# Patient Record
Sex: FEMALE | Race: WHITE | NOT HISPANIC OR LATINO | Employment: FULL TIME | URBAN - METROPOLITAN AREA
[De-identification: names, ages, dates, MRNs, and addresses within clinical notes are randomized per-mention and may not be internally consistent; named-entity substitution may affect disease eponyms.]

---

## 2017-08-09 ENCOUNTER — TELEPHONE (OUTPATIENT)
Dept: UROLOGY | Facility: CLINIC | Age: 42
End: 2017-08-09

## 2017-08-09 DIAGNOSIS — N20.0 NEPHROLITHIASIS: Primary | ICD-10-CM

## 2017-08-09 RX ORDER — HYDROCODONE BITARTRATE AND ACETAMINOPHEN 5; 325 MG/1; MG/1
1 TABLET ORAL EVERY 6 HOURS PRN
Qty: 20 TABLET | Refills: 0 | Status: SHIPPED | OUTPATIENT
Start: 2017-08-09 | End: 2017-08-17 | Stop reason: SDUPTHER

## 2017-08-09 RX ORDER — TAMSULOSIN HYDROCHLORIDE 0.4 MG/1
0.4 CAPSULE ORAL
Qty: 30 CAPSULE | Refills: 0 | Status: ON HOLD | OUTPATIENT
Start: 2017-08-09 | End: 2017-08-21 | Stop reason: HOSPADM

## 2017-08-09 RX ORDER — CIPROFLOXACIN 500 MG/1
500 TABLET ORAL 2 TIMES DAILY
Qty: 28 TABLET | Refills: 0 | Status: ON HOLD | OUTPATIENT
Start: 2017-08-09 | End: 2017-08-21 | Stop reason: HOSPADM

## 2017-08-09 NOTE — TELEPHONE ENCOUNTER
This pt was seen at [facility] today but note was written into [facility] for convenience and making future follow-up easier. It was copied over to [facility] notes.     [facility] consult Note - [facility]  Staff: MD Naldo    Referring provider and please cc: Jesús Castillo  PCP:     [facility]: [place]    Chief Complaint: left flank pain    Subjective:     HPI: Nilsa York is a 42 y.o. female presents with     Pt presented to [place] 2 weeks ago with left sided flank pain. Was found to have kidney stones and was sent home with flomax and pain medicines. She was told she had no infection. Pain improved, however this morning started having severe left flank pain 10/10 with dysuria, frequency and urgency.  She state she had no fevers or chills at home. She's had 1 uti years ago.     This is her first stone.     ECOG Status: 0    REVIEW OF SYSTEMS:  General ROS: no fevers, no chills  Psychological ROS: no depression  Endocrine ROS: no heat or cold  Respiratory ROS: no SOB  Cardiovascular ROS: no CP  Gastrointestinal ROS: no abdominal pain, no constipation, no diarrhea, noBRBPR  Musculoskeletal ROS: no muscle pain  Neurological ROS: no headaches  Dermatological ROS: no rashes  HEENT: noglasses, no sinus   ROS: per HPI     PMHx:  No past medical history on file.  Kidney stones: Yes - as above.    PSHx:  No past surgical history on file.   Cholecystectomy  Gastric sleeve  BTL  Urologic or Gynecologic Surgery: btl    Stents/Valves/Foreign Bodies: No  Cardiac Evaluation: No    Screening Studies  Colonoscopy: no    Fam Hx:   malignancies: No  kidney stones: No     Soc Hx:  No tobacco.   No alcohol  Lives in [place]  :single  Children: 6  Occupation:CNA    Allergies:  Review of patient's allergies indicates not on file.   nkda    Medications: reviewed   Anticoagulation: No    Objective:   Vitals reviewed    General:WDWN in NAD  Eyes: PERRLA, normal conjunctiva  Respiratory: no increased work on  breathing, clear to auscultation  Cardiovascular: regular rate and rhythm. No obvious extremity edema.  GI: no palpation of masses. No tenderness. No hepatosplenomegaly to palpation.  Musculoskeletal: normal range of motion of bilateral upper extremities. Normal muscle strength and tone.  Skin: no obvious rashes or lesions. No tightening of skin noted.  Neurologic: CN grossly normal. Normal sensation.   Psychiatric: awake, alert and oriented x 3. Mood and affect normal. Cooperative.    Pelvic exam  deferred    LABS REVIEW:  UA today: nitrite +/leuk+/blood +  UCx:   pending  Cr: No results found for: CREATININE    PATHOLOGY REVIEW:  none    RADIOGRAPHIC REVIEW:  [facility] 8/9/17 [facility]  1. 6.5 mm x 4 mm calculus obstructing the distal left ureter with left-sided hydronephrosis   2. 2mm lup stones x 3, one in possible diverticulum. rmp 5mm stone.   3. Mild hepatosplenomegaly and hepatic fatty infiltration 4. No other convincing acute process on this limited noncontrasted study         Assessment:     1. Nephrolithiasis        Plan:     To OR today for cysto stent and possible ureteroscopy. I explained with her nitrite + urine that we may just place stent. I said that if there was pus seen behind the stone I would just place a stent. However if there was not, then I may proceed with ureteroscopy as she's had no fevers and her wbc is only 6.2.    I also offered her stent placement today with ureteroscopy in 2 weeks to address her left renal stones as well which may be in a diverticulum. At this point she wants to just proceed with treatment of the distal stone.    She will need a stone workup and ultimately will need treatment of her renal stones.    Also told her that with her nitrite + urine she would need a ibarra and to remain here for at least 24 hours. If she has no fever overnight she can have ibarra removed and discharged tomorrow at 6 pm with po abx, pain medicine and flomax which I will send in.          Nilsa ESTEVEZ  MD Naldo

## 2017-08-09 NOTE — TELEPHONE ENCOUNTER
I did ureteroscopy today on this pt at [facility]    Please make sure pt has a 9am f/u next Thursday to discuss her stent. I sent a message about moving another pt.     [facility]

## 2017-08-09 NOTE — TELEPHONE ENCOUNTER
This pt was seen at [facility] today but note was written into [facility] for convenience and making future follow-up easier. It was copied over to [facility] notes.     [facility] - Procedure done at [facility]  Operative Note    Date: 08/09/2017    Pre-Op Diagnosis: left uvj stone    Post-Op Diagnosis: same    Procedure(s) Performed:   1.  Left ureteroscopy, basket stone extraction  2.  Left rgp  3.  Left stent placement  4.  Fluoro < 1 h    Specimen(s): stone from left uvj. urinalyis and culture from left kidney.     Staff Surgeon: Nilsa Hitchcock MD    Anesthesia: General endotracheal anesthesia    Indications: Nilsa York is a 42 y.o. female with a left distal ureteral stone, flank pain, nitrite + urine, presenting for definitive stone management.  She currently does not have a JJ ureteral stent in place.  She was given rocephin this morning at 5 and another 2g prior to procedure.     Findings:   1. Left uvj stone with proximal hydro,  2. The stone was difficult to see if it was radio-opaque bc it was overlying the pelvic bone  3. No pus seen proximal stone, completely clear so I decided to proceed with ureteroscopy and stone extraction.    1 basket(s) were used throughout the case.      Estimated Blood Loss: min    Drains: 6x22 JJ stent without strings, 16 fr ibarra    Procedure in detail:  After informed consent was obtained, the patient was brought the the cystoscopy suite and placed in the supine position.  SCDs were applied and working.  GETA was administered.  The patient was then placed in the dorsal lithotomy position and prepped and draped in the usual sterile fashion.      A rigid cystoscope in a 22 Fr sheath was introduced into the patient's urethra.  This passed easily.  The entire urethra was visualized which showed no strictures or masses.  Formal cystoscopy was performed which revealed no masses or lesions suspicious for malignancy, no bladder stones, no bladder diverticuli, no  trabeculations.  The ureteral orifices were visualized in the normal anatomic position bilaterally and clear efflux was visualized.      A 0.38 sensor tip wire was passed up the left ureteral orifice and up into the kidney, the stone was felt with the wire.  This passed easily and placement was confirmed using fluoro. A 5 [place] open ended ureteral catheter was palced over the wire and the wire was removed. Urine was obtained from the left ureter and kidney and was clear yellow. I went ahead and sent a culture. The wire was replaced through the 5 [place] open ended ureteral cathetera and the 5 [place] open ended ureteral catheter was removed.   The cystoscope was removed keeping the guidewire in place and the wire was secured to the drape.      An 8 Fr rigid ureteroscope was passed into the patient's bladder alongside the wire under direct vision easily without much dilation required.  It was then passed through the left UO alongside the wire.  A stone was encountered at the level of distal ureter.  A Nitinol tipless basket was introduced through the ureteroscope.  The entire stone was basketed and removed.  The ureteroscope was removed keeping the wire in place. The ureterosope was advanced back into the ureter and the left ureter was drained. A rgp was then performed through the ureteroscope.  The wire was backloaded through the cytoscope using a pusher.    A 6x22 JJ ureteral stent without strings was passed over the wire and up into the renal pelvis using fluoro.  When the coil appeared to be in good position int he kidney and the radio-opaque marker of the pusher was at the inferior pubis, the wire was removed under continuous fluoro.  Good coils were seen in the kidney and the bladder using fluoro.      A ibarra catheter was placed.    The patient tolerated the procedure well and was transferred to the recovery room in stable condition.      Disposition:    The pt will remain in the hospital overnight on iv abx  and fluids.  Bolus 1 L and start cipro 500 bid   If pt has no temp>100 then ibarra can be removed at 5am.  If she has no temp >100 and she is able to void then she can be discharged at 5pm tomorrow.     I plan on sending her home on cipro 500 bid x 10 days, norco 5 (disp 20) and will have her continue flomax. I have sent the rx escript to [facility].    She will return next Thursdayf for a urinalysis and urine culture. Will discuss with her either stent removal the following Monday vs ueteroscopy to remove the 3 remaining stones in her left kidney which I suggest.       Nilsa Hitchcock MD

## 2017-08-10 ENCOUNTER — TELEPHONE (OUTPATIENT)
Dept: UROLOGY | Facility: CLINIC | Age: 42
End: 2017-08-10

## 2017-08-10 NOTE — TELEPHONE ENCOUNTER
----- Message from Nilsa Hitchcock MD sent at 8/9/2017  5:20 PM CDT -----  I vinicius did a ureteroscopy on her today at [facility]  Pt is going to f/u next Thursday at 9am  You can move rian mosquera to annual with abigail

## 2017-08-10 NOTE — TELEPHONE ENCOUNTER
Patient scheduled.  Unable to contact patient to inform of appointment. Will inform the nurse at [facility] (spoke to Mrs. Francois)

## 2017-08-11 ENCOUNTER — TELEPHONE (OUTPATIENT)
Dept: UROLOGY | Facility: CLINIC | Age: 42
End: 2017-08-11

## 2017-08-11 NOTE — TELEPHONE ENCOUNTER
Called and spoke with the pharmacist at [facility], informed her that the patient has not been seen at our office as of yet and we have no insurance info on patient to get a prior auth even started. They will notify the patient, she verbally understood.

## 2017-08-11 NOTE — TELEPHONE ENCOUNTER
Call placed pharmacy, inquired about patient needing a prior auth started on flomax. She stated that it was just faxed to our office, awaiting paperwork to get it started.

## 2017-08-11 NOTE — TELEPHONE ENCOUNTER
----- Message from Mago Jewell sent at 8/11/2017 12:22 PM CDT -----  Please call patient in regards to needing a authorization on [employer], [phone]    [facility] [id number] - [place], [place] - [address] AT [facility] [address]  [address]  [place] [address]  Phone: [phone] Fax: [phone]

## 2017-08-14 ENCOUNTER — TELEPHONE (OUTPATIENT)
Dept: UROLOGY | Facility: CLINIC | Age: 42
End: 2017-08-14

## 2017-08-14 NOTE — TELEPHONE ENCOUNTER
----- Message from Nilsa Hitchcock MD sent at 8/9/2017  5:06 PM CDT -----  F/u urine culture from [facility]

## 2017-08-14 NOTE — TELEPHONE ENCOUNTER
----- Message from Casie Rosado sent at 8/14/2017  8:11 AM CDT -----  Patient needs return to work letter faxed to [phone] ([place] )atten: Mariola Devries/please call patient back at [phone] to confirm.

## 2017-08-17 ENCOUNTER — OFFICE VISIT (OUTPATIENT)
Dept: UROLOGY | Facility: CLINIC | Age: 42
End: 2017-08-17
Payer: MEDICAID

## 2017-08-17 VITALS
HEIGHT: 64 IN | BODY MASS INDEX: 37.48 KG/M2 | HEART RATE: 87 BPM | SYSTOLIC BLOOD PRESSURE: 140 MMHG | WEIGHT: 219.56 LBS | DIASTOLIC BLOOD PRESSURE: 54 MMHG | TEMPERATURE: 99 F

## 2017-08-17 DIAGNOSIS — N20.0 NEPHROLITHIASIS: Primary | ICD-10-CM

## 2017-08-17 PROCEDURE — 87086 URINE CULTURE/COLONY COUNT: CPT

## 2017-08-17 PROCEDURE — 99214 OFFICE O/P EST MOD 30 MIN: CPT | Mod: PBBFAC,PO | Performed by: UROLOGY

## 2017-08-17 PROCEDURE — 99213 OFFICE O/P EST LOW 20 MIN: CPT | Mod: S$PBB,,, | Performed by: UROLOGY

## 2017-08-17 PROCEDURE — 3008F BODY MASS INDEX DOCD: CPT | Mod: ,,, | Performed by: UROLOGY

## 2017-08-17 PROCEDURE — 87077 CULTURE AEROBIC IDENTIFY: CPT

## 2017-08-17 PROCEDURE — 87186 SC STD MICRODIL/AGAR DIL: CPT

## 2017-08-17 PROCEDURE — 87088 URINE BACTERIA CULTURE: CPT

## 2017-08-17 PROCEDURE — 99999 PR PBB SHADOW E&M-EST. PATIENT-LVL IV: CPT | Mod: PBBFAC,,, | Performed by: UROLOGY

## 2017-08-17 RX ORDER — HYDROCODONE BITARTRATE AND ACETAMINOPHEN 5; 325 MG/1; MG/1
1 TABLET ORAL EVERY 6 HOURS PRN
Qty: 30 TABLET | Refills: 0 | Status: ON HOLD | OUTPATIENT
Start: 2017-08-17 | End: 2017-08-21 | Stop reason: HOSPADM

## 2017-08-17 NOTE — PROGRESS NOTES
[facility] Progress Note - [place]  Urology Group: Naldo/Zachary/Willis/[facility]  PCP: Dr.Jeffrey Buntin [facility]: active    Chief Complaint: nephrolithiasis      Subjective:        HPI: Nilsa York is a 42 y.o. female presents with     Pt presented to [place] 3 weeks ago with left sided flank pain. Was found to have kidney stones and was sent home with flomax and pain medicines. She was told she had no infection. Pain improved, however on 8/9/17 started having severe left flank pain 10/10 with dysuria, frequency and urgency.  She state she had no fevers or chills at home. She's had 1 uti years ago. Went to . Found to have nitrite + urine. cx grew no bacteria. She had a left 7x4mm uvj stone and bc it was so distal and the urine was clear proximal to this I went ahead and removed it and placed a stent without strings. She was discharged the following day on cipro 500 bid x 10d.    She did fine after, tolerated stent with pain medicine. Returned 5d later on Tuesday to [facility] with severe pain. ua was negative. Ct at [facility] ERdone showed no more stones. Urine today. She's taking norco 5 every 4 hours. Still on cipro, has no more pain medicine.     +dysuria     This is her first stone      REVIEW OF SYSTEMS:  General ROS: no fevers, no chills  Psychological ROS: no depression  Endocrine ROS: no heat or cold  Respiratory ROS: no SOB  Cardiovascular ROS: no CP  Gastrointestinal ROS: no abdominal pain, no constipation, no diarrhea, no BRBPR  Musculoskeletal ROS: no muscle pain  Neurological ROS: no headaches  Dermatological ROS: no rashes  HEENT: noglasses, no sinus   ROS: per HPI    The past medical, surgical, social and family hx were reviewed. There have not been any changes.       Urologic meds: flomax  Anticoagulation: No    Objective:     Vitals:    08/17/17 1009   BP: (!) 140/54   Pulse: 87   Temp: 98.5 °F (36.9 °C)          Physical Exam   Nursing note and vitals  reviewed.  Constitutional: She is oriented to person, place, and time. She appears well-developed and well-nourished.   HENT:   Head: Normocephalic and atraumatic.   Eyes: Pupils are equal, round, and reactive to light.   Cardiovascular: Normal rate and regular rhythm.    Pulmonary/Chest: Effort normal.   Abdominal: Soft.   Musculoskeletal: Normal range of motion.   Neurological: She is alert and oriented to person, place, and time.   Skin: Skin is intact.     Psychiatric: She has a normal mood and affect.         Urinalysis: bloody  UA   UA 8/9/17: nitrite +/leuk+/blood +  UCx:   8/9/17 Ng  8/15/17 ng    Cr: No results found for: CREATININE     PATHOLOGY REVIEW:  none     RADIOGRAPHIC REVIEW:  [facility] 8/15/17 [facility]  1. Interval placement of a left-sided double-J ureteral stent with no hydronephrosis or hydroureter seen in either kidney. There is mild left-sided periureteric fat stranding, a somewhat nonspecific finding likely related to the recent instrumentation, or possibly mild infection (consider correlation with urinalysis).   2. Interval removal/nonvisualization of the previously described distal right ureter stone.   3. Small bilateral nonobstructing renal stones.   4. Borderline hepatosplenomegaly.   5. Additional incidental findings as noted above similar to unchanged from the previous exam.       [facility] 8/9/17 h  1. 6.5 mm x 4 mm calculus obstructing the distal left ureter with left-sided hydronephrosis   2. 2mm lup stones x 3, one in possible diverticulum. rmp 5mm stone.   3. Mild hepatosplenomegaly and hepatic fatty infiltration 4. No other convincing acute process on this limited noncontrasted study     Assessment:       1. Nephrolithiasis        Plan:     Refill pain medicine today  Will write for uribel   Send urine for culture today    Discussed proceeding w urs to remove remaining renal stones. However stones in kidney are very small. May be in a diverticulum. Will address at another time. Pt not  tolerating stent and wants to go back to work.     May need to irrigate bladder first prior to cysto stent removal  Urine has been bloody  Scheduled for cysto stent removal as first case 8/21/17  Gent 80 prior

## 2017-08-21 ENCOUNTER — APPOINTMENT (OUTPATIENT)
Dept: LAB | Facility: HOSPITAL | Age: 42
End: 2017-08-21
Attending: UROLOGY
Payer: MEDICAID

## 2017-08-21 ENCOUNTER — HOSPITAL ENCOUNTER (OUTPATIENT)
Facility: AMBULARY SURGERY CENTER | Age: 42
Discharge: HOME | End: 2017-08-21
Attending: UROLOGY | Admitting: UROLOGY
Payer: MEDICAID

## 2017-08-21 ENCOUNTER — SURGERY (OUTPATIENT)
Age: 42
End: 2017-08-21

## 2017-08-21 ENCOUNTER — TELEPHONE (OUTPATIENT)
Dept: UROLOGY | Facility: CLINIC | Age: 42
End: 2017-08-21

## 2017-08-21 DIAGNOSIS — N20.0 NEPHROLITHIASIS: ICD-10-CM

## 2017-08-21 LAB — BACTERIA UR CULT: NORMAL

## 2017-08-21 PROCEDURE — 52310 CYSTOSCOPY AND TREATMENT: CPT | Mod: ,,, | Performed by: UROLOGY

## 2017-08-21 PROCEDURE — 87086 URINE CULTURE/COLONY COUNT: CPT

## 2017-08-21 PROCEDURE — 52310 CYSTOSCOPY AND TREATMENT: CPT | Performed by: UROLOGY

## 2017-08-21 RX ORDER — GENTAMICIN SULFATE 80 MG/100ML
80 INJECTION, SOLUTION INTRAVENOUS
Status: DISCONTINUED | OUTPATIENT
Start: 2017-08-21 | End: 2017-08-21

## 2017-08-21 RX ORDER — GENTAMICIN SULFATE 40 MG/ML
INJECTION, SOLUTION INTRAMUSCULAR; INTRAVENOUS
Status: COMPLETED
Start: 2017-08-21 | End: 2017-08-21

## 2017-08-21 RX ORDER — WATER 1 ML/ML
IRRIGANT IRRIGATION
Status: DISCONTINUED | OUTPATIENT
Start: 2017-08-21 | End: 2017-08-21 | Stop reason: HOSPADM

## 2017-08-21 RX ORDER — GENTAMICIN SULFATE 40 MG/ML
80 INJECTION, SOLUTION INTRAMUSCULAR; INTRAVENOUS ONCE
Status: COMPLETED | OUTPATIENT
Start: 2017-08-21 | End: 2017-08-21

## 2017-08-21 RX ORDER — NITROFURANTOIN (MACROCRYSTALS) 100 MG/1
100 CAPSULE ORAL EVERY 12 HOURS
Qty: 14 CAPSULE | Refills: 2 | Status: SHIPPED | OUTPATIENT
Start: 2017-08-21 | End: 2017-08-25

## 2017-08-21 RX ORDER — LIDOCAINE HYDROCHLORIDE 20 MG/ML
JELLY TOPICAL
Status: DISCONTINUED | OUTPATIENT
Start: 2017-08-21 | End: 2017-08-21 | Stop reason: HOSPADM

## 2017-08-21 RX ADMIN — WATER 500 ML: 1 IRRIGANT IRRIGATION at 02:08

## 2017-08-21 RX ADMIN — LIDOCAINE HYDROCHLORIDE 5 ML: 20 JELLY TOPICAL at 02:08

## 2017-08-21 RX ADMIN — GENTAMICIN SULFATE 80 MG: 40 INJECTION, SOLUTION INTRAMUSCULAR; INTRAVENOUS at 02:08

## 2017-08-21 NOTE — DISCHARGE SUMMARY
[facility]  Discharge Note  Short Stay    Admit Date: 8/21/2017    Discharge Date and Time: No discharge date for patient encounter.     Attending Physician: Nilsa Hitchcock,*     Discharge Provider: Nilsa Hitchcock    Diagnoses:  Active Hospital Problems    Diagnosis  POA    Nephrolithiasis [N20.0]  Yes      Resolved Hospital Problems    Diagnosis Date Resolved POA   No resolved problems to display.       Discharged Condition: good    Hospital Course: Patient was admitted for an outpatient procedure and tolerated the procedure well with no complications.    Final Diagnoses: Same as principal problem.    Disposition: Home or Self Care    Follow up/Patient Instructions:    Medications:  Reconciled Home Medications:   Current Discharge Medication List      START taking these medications    Details   nitrofurantoin (MACRODANTIN) 100 MG capsule Take 1 capsule (100 mg total) by mouth every 12 (twelve) hours.  Qty: 14 capsule, Refills: 2         STOP taking these medications       ciprofloxacin HCl (CIPRO) 500 MG tablet Comments:   Reason for Stopping:         hydrocodone-acetaminophen 5-325mg (NORCO) 5-325 mg per tablet Comments:   Reason for Stopping:         methen-m.blue-s.phos-phsal-hyo (URIBEL) 118-10-40.8-36 mg Cap Comments:   Reason for Stopping:         tamsulosin (FLOMAX) 0.4 mg Cp24 Comments:   Reason for Stopping:               Discharge Procedure Orders  X-Ray Abdomen AP 1 View   Standing Status: Future  Standing Exp. Date: 08/21/18   Order Specific Question Answer Comments   Reason for Exam: left renal stones      Diet general           Discharge Procedure Orders (must include Diet, Follow-up, Activity):    Discharge Procedure Orders (must include Diet, Follow-up, Activity)  X-Ray Abdomen AP 1 View   Standing Status: Future  Standing Exp. Date: 08/21/18   Order Specific Question Answer Comments   Reason for Exam: left renal stones      Diet general

## 2017-08-21 NOTE — OP NOTE
[facility] Procedure Note  Date: 08/21/2017    Procedure: Flexible cysto-uretheroscopy and left stent removal    Pre Procedure Diagnosis: nephrolithiasis    Post Procedure Diagnosis: same    UA: tr blood, no sx of uti - given gent preop  ucx + 8/17/17 for e.coli ESBL - has been on cipro (not sensitive)    Surgeon: Nilsa Hitchcock MD    Specimen: urine for culture    Anesthesia: 2% uro-jet lidocaine jelly for local analgesia    Flexible cysto-urethroscopy was performed after consent was obtained.  The risks and benefits were explained.    2% lidocaine urojet was used for local analgesia.  The genitalia was prepped and draped in the sterile fashion with betadine.    The flexible scope was advanced into the urethra and into the bladder.  Bilateral ureteral orifice were evaluated and noted to be normal with clear efflux.  The bladder was completely surveyed in a systematic fashion.   No bladder tumors or lesions were seen.  No strictures were noted.    A stent was identified, grasped and removed.     The patient tolerated the procedure well without complication.    HPI: Nilsa York is a 42 y.o. female who presents for cystoscopy today for stent removal    Findings: (pictures were not uploaded into media)  1. Stent without encrustations      Plan:  1. F/u 6 months with kub prior to eval L renal stones  2. Counseled to go to ER if she has fever  3. Discontinue cipro and start macrobid 100 po bid x 7 days (sensitive)      Follow up:  6 months with kub prior  1st stone, may do stone workup depending on what stones look like

## 2017-08-21 NOTE — H&P
[facility] H&P Note - [place]  Urology Group: Naldo/Zachary/Willis/[facility]  PCP: Dr.Jeffrey Buntin [facility]: active     Chief Complaint: nephrolithiasis        Subjective:        HPI: Nilsa York is a 42 y.o. female presents with      Pt presented to [place] 3 weeks ago with left sided flank pain. Was found to have kidney stones and was sent home with flomax and pain medicines. She was told she had no infection. Pain improved, however on 8/9/17 started having severe left flank pain 10/10 with dysuria, frequency and urgency.  She state she had no fevers or chills at home. She's had 1 uti years ago. Went to . Found to have nitrite + urine. cx grew no bacteria. She had a left 7x4mm uvj stone and bc it was so distal and the urine was clear proximal to this I went ahead and removed it and placed a stent without strings. She was discharged the following day on cipro 500 bid x 10d.     She did fine after, tolerated stent with pain medicine. Returned 5d later on Tuesday to [facility] with severe pain. ua was negative. Ct at [facility] ERdone showed no more stones. Urine today. She's taking norco 5 every 4 hours. Still on cipro, has no more pain medicine.      +dysuria     This is her first stone        REVIEW OF SYSTEMS:  General ROS: no fevers, no chills  Psychological ROS: no depression  Endocrine ROS: no heat or cold  Respiratory ROS: no SOB  Cardiovascular ROS: no CP  Gastrointestinal ROS: no abdominal pain, no constipation, no diarrhea, no BRBPR  Musculoskeletal ROS: no muscle pain  Neurological ROS: no headaches  Dermatological ROS: no rashes  HEENT: noglasses, no sinus   ROS: per HPI     The past medical, surgical, social and family hx were reviewed. There have not been any changes.         Urologic meds: flomax  Anticoagulation: No     Objective:      Vitals:    08/21/17 1353   BP: 126/73   Pulse: 65   Resp: 18   Temp: 98.3 °F (36.8 °C)             Physical Exam   Nursing note and vitals  reviewed.  Constitutional: She is oriented to person, place, and time. She appears well-developed and well-nourished.   HENT:   Head: Normocephalic and atraumatic.   Eyes: Pupils are equal, round, and reactive to light.   Cardiovascular: Normal rate and regular rhythm.    Pulmonary/Chest: Effort normal.   Abdominal: Soft.   Musculoskeletal: Normal range of motion.   Neurological: She is alert and oriented to person, place, and time.   Skin: Skin is intact.     Psychiatric: She has a normal mood and affect.          Urinalysis: 1.025/5/100 protein/250 blood - send for cultrue    UCx:   8/17/17 E.coli, ESBL   8/9/17            Ng,  nitrite +/leuk+/blood +  8/15/17                      ng     Cr: No results found for: CREATININE     PATHOLOGY REVIEW:  none     RADIOGRAPHIC REVIEW:  [facility] 8/15/17 [facility]  1. Interval placement of a left-sided double-J ureteral stent with no hydronephrosis or hydroureter seen in either kidney. There is mild left-sided periureteric fat stranding, a somewhat nonspecific finding likely related to the recent instrumentation, or possibly mild infection (consider correlation with urinalysis).   2. Interval removal/nonvisualization of the previously described distal right ureter stone.   3. Small bilateral nonobstructing renal stones.   4. Borderline hepatosplenomegaly.   5. Additional incidental findings as noted above similar to unchanged from the previous exam.         [facility] 8/9/17 [facility]  1. 6.5 mm x 4 mm calculus obstructing the distal left ureter with left-sided hydronephrosis   2. 2mm lup stones x 3, one in possible diverticulum. rmp 5mm stone.   3. Mild hepatosplenomegaly and hepatic fatty infiltration 4. No other convincing acute process on this limited noncontrasted study      Assessment:       1. Nephrolithiasis        Plan:      Refill pain medicine today  Will write for uribel     Discussed proceeding w urs to remove remaining renal stones. However stones in kidney are very small. May be  in a diverticulum. Will address at another time. Pt not tolerating stent and wants to go back to work.      May need to irrigate bladder first prior to cysto stent removal  Urine has been bloody. Culture 8/17/17 showed e.coli esbl   Scheduled for cysto stent removal as first case today  Gent 80 prior    This patient has been cleared for surgery in ambulatory surgical facility.

## 2017-08-21 NOTE — PLAN OF CARE
Pt states ready to go home , stable, stef po fluids, ambulatory, denies pain. To BR voided wo difficulty. Denies burning/bleeding. Ambulated to car w/ spouse. Home w/ spouse

## 2017-08-22 VITALS
RESPIRATION RATE: 20 BRPM | HEIGHT: 64 IN | TEMPERATURE: 98 F | HEART RATE: 64 BPM | OXYGEN SATURATION: 100 % | WEIGHT: 219 LBS | SYSTOLIC BLOOD PRESSURE: 153 MMHG | DIASTOLIC BLOOD PRESSURE: 74 MMHG | BODY MASS INDEX: 37.39 KG/M2

## 2017-08-22 LAB — BACTERIA UR CULT: NORMAL

## 2017-08-23 ENCOUNTER — TELEPHONE (OUTPATIENT)
Dept: UROLOGY | Facility: CLINIC | Age: 42
End: 2017-08-23

## 2017-08-23 NOTE — TELEPHONE ENCOUNTER
----- Message from Marilyn Cai sent at 8/23/2017  8:19 AM CDT -----  Contact: Patient  Nilsa, patient [phone], returning the nurse's call. Please advise. Thanks.

## 2017-08-23 NOTE — TELEPHONE ENCOUNTER
Phoned patient she states she is returning a call from Sushant. Informed patient will forward message and will have sushant give her a call back. Patient verbally voiced understanding.

## 2018-02-21 ENCOUNTER — TELEPHONE (OUTPATIENT)
Dept: UROLOGY | Facility: CLINIC | Age: 43
End: 2018-02-21

## 2019-06-11 ENCOUNTER — OFFICE VISIT (OUTPATIENT)
Dept: URGENT CARE | Facility: CLINIC | Age: 44
End: 2019-06-11
Payer: OTHER MISCELLANEOUS

## 2019-06-11 VITALS
OXYGEN SATURATION: 99 % | DIASTOLIC BLOOD PRESSURE: 86 MMHG | HEIGHT: 64 IN | WEIGHT: 181.63 LBS | SYSTOLIC BLOOD PRESSURE: 140 MMHG | HEART RATE: 77 BPM | TEMPERATURE: 98 F | BODY MASS INDEX: 31.01 KG/M2

## 2019-06-11 DIAGNOSIS — M25.561 ACUTE PAIN OF RIGHT KNEE: Primary | ICD-10-CM

## 2019-06-11 PROCEDURE — 99204 PR OFFICE/OUTPT VISIT, NEW, LEVL IV, 45-59 MIN: ICD-10-PCS | Mod: 25,S$GLB,, | Performed by: NURSE PRACTITIONER

## 2019-06-11 PROCEDURE — 73564 X-RAY EXAM KNEE 4 OR MORE: CPT | Mod: RT,S$GLB,, | Performed by: NURSE PRACTITIONER

## 2019-06-11 PROCEDURE — 73564 PR  X-RAY KNEE 4+ VIEW: ICD-10-PCS | Mod: RT,S$GLB,, | Performed by: NURSE PRACTITIONER

## 2019-06-11 PROCEDURE — 99204 OFFICE O/P NEW MOD 45 MIN: CPT | Mod: 25,S$GLB,, | Performed by: NURSE PRACTITIONER

## 2019-06-11 RX ORDER — TOPIRAMATE 100 MG/1
200 TABLET, FILM COATED ORAL 2 TIMES DAILY
COMMUNITY

## 2019-06-11 RX ORDER — IBUPROFEN 800 MG/1
800 TABLET ORAL 3 TIMES DAILY
COMMUNITY
End: 2019-07-05 | Stop reason: SDUPTHER

## 2019-06-11 RX ORDER — LISINOPRIL 40 MG/1
40 TABLET ORAL DAILY
COMMUNITY

## 2019-06-11 RX ORDER — DEXTROMETHORPHAN HYDROBROMIDE, GUAIFENESIN 5; 100 MG/5ML; MG/5ML
650 LIQUID ORAL EVERY 8 HOURS
COMMUNITY

## 2019-06-11 RX ORDER — IBUPROFEN 800 MG/1
800 TABLET ORAL EVERY 8 HOURS PRN
Qty: 30 TABLET | Refills: 0 | Status: SHIPPED | OUTPATIENT
Start: 2019-06-11 | End: 2019-06-21

## 2019-06-11 RX ORDER — VENLAFAXINE HYDROCHLORIDE 150 MG/1
225 CAPSULE, EXTENDED RELEASE ORAL DAILY
COMMUNITY

## 2019-06-11 NOTE — PATIENT INSTRUCTIONS
Knee Pain  Knee pain is very common. Its especially common in active people who put a lot of pressure on their knees, like runners. It affects women more often than men.  Your kneecap (patella) is a thick, round bone. It covers and protects the front portion of your knee joint. It moves along a groove in your thighbone (femur) as part of the patellofemoral joint. A layer of cartilage surrounds the underside of your kneecap. This layer protects it from grinding against your femur.  When this cartilage softens and breaks down, it can cause knee pain. This is partly because of repetitive stress. The stress irritates the lining of the joint. This causes pain in the underlying bone.  What causes knee pain?  Many things can cause knee pain. You may have more than one cause. Some of these include:  · Overuse of the knee joint  · The kneecap doesnt line up with the tissue around it  · Damage to small nerves in the area  · Damage to the ligament-like structure that holds the kneecap in place (retinaculum)  · Breakdown of the bone under the cartilage  · Swelling in the soft tissues around the kneecap  · Injury  You might be more likely to have knee pain if you:  · Exercise a lot  · Recently increased the intensity of your workouts  · Have a body mass index (BMI) greater than 25  · Have poor alignment of your kneecap  · Walk with your feet turned overly outward or inward  · Have weakness in surrounding muscle groups (inner quad or hip adductor muscles)  · Have too much tightness in surrounding muscle groups (hamstrings or iliotibial band)  · Have a recent history of injury to the area  · Are female  Symptoms of knee pain  This type of knee pain is a dull, aching pain in the front of the knee in the area under and around the kneecap. This pain may start quickly or slowly. Your pain might be worse when you squat, run, or sit for a long time. You might also sometimes feel like your knee is giving out. You may have symptoms in  one or both of your knees.  Diagnosing knee pain  Your healthcare provider will ask about your medical history and your symptoms. Be sure to describe any activities that make your knee pain worse. He or she will look at your knee. This will include tests of your range of motion, strength, and areas of pain of your knee. Your knee alignment will be checked.  Your healthcare provider will need to rule out other causes of your knee pain, such as arthritis. You may need an imaging test, such as an X-ray or MRI.  Treatment for knee pain  Treatments that can help ease your symptoms may include:  · Avoiding activities for a while that make your pain worse, returning to activity over time  · Icing the outside of your knee when it causes you pain  · Taking over-the-counter pain medicine  · Wearing a knee brace or taping your knee to support it  · Wearing special shoe inserts to help keep your feet in the proper alignment  · Doing special exercises to stretch and strengthen the muscles around your hip and your knee  These steps help most people manage knee pain. But some cases of knee pain need to be treated with surgery. You may need surgery right away. Or you may need it later if other treatments dont work. Your healthcare provider may refer you to an orthopedic surgeon. He or she will talk with you about your choices.  Preventing knee pain  Losing weight and correcting excess muscle tightness or muscle weakness may help lower your risk.  In some cases, you can prevent knee pain. To help prevent a flare-up of knee pain, you do these things:  · Regularly do all the exercises your doctor or physical therapist advises  · Support your knee as advised by your doctor or physical therapist  · Increase training gradually, and ease up on training when needed  · Have an expert check your gait for running or other sporting activities  · Stretch properly before and after exercise  · Replace your running shoes regularly  · Lose excess  weight     When to call your healthcare provider  Call your healthcare provider right away if:  · Your symptoms dont get better after a few weeks of treatment  · You have any new symptoms   Date Last Reviewed: 4/1/2017 © 2000-2017 [employer]. [address] [place] [address], [place] [address]. All rights reserved. This information is not intended as a substitute for professional medical care. Always follow your healthcare professional's instructions.        R.I.C.E.    R.I.C.E. stands for Rest, Ice, Compression, and Elevation. Doing these things helps limit pain and swelling after an injury. R.I.C.E. also helps injuries heal faster. Use R.I.C.E. for sprains, strains, and severe bruises or bumps. Follow the tips on this handout and begin R.I.C.E. as soon as possible after an injury.  ? Rest  Pain is your bodys way of telling you to rest an injured area. Whether you have hurt an elbow, hand, foot, or knee, limiting its use will prevent further injury and help you heal.  ? Ice  Applying ice right after an injury helps prevent swelling and reduce pain. Dont place ice directly on your skin.  · Wrap a cold pack or bag of ice in a thin cloth. Place it over the injured area.  · Ice for 10 minutes every 3 hours. Dont ice for more than 20 minutes at a time.  ? Compression  Putting pressure (compression) on an injury helps prevent swelling and provides support.  · Wrap the injured area firmly with an elastic bandage. If your hand or foot tingles, becomes discolored, or feels cold to the touch, the bandage may be too tight. Rewrap it more loosely.  · If your bandage becomes too loose, rewrap it.  · Do not wear an elastic bandage overnight.  ? Elevation  Keeping an injury elevated helps reduce swelling, pain, and throbbing. Elevation is most effective when the injury is kept elevated higher than the heart.     Call your healthcare provider if you notice any of the following:  · Fingers or toes feel numb, are cold to  the touch, or change color  · Skin looks shiny or tight  · Pain, swelling, or bruising worsens and is not improved with elevation   Date Last Reviewed: 9/3/2015  © 2245-2409 [employer]. [address] [place] [address], [place] [address]. All rights reserved. This information is not intended as a substitute for professional medical care. Always follow your healthcare professional's instructions.

## 2019-06-11 NOTE — PROGRESS NOTES
"Subjective:       Patient ID: Nilsa York is a 44 y.o. female.    Vitals:  height is 5' 4" (1.626 m) and weight is 82.4 kg (181 lb 9.6 oz). Her oral temperature is 98.4 °F (36.9 °C). Her blood pressure is 140/86 (abnormal) and her pulse is 77. Her oxygen saturation is 99%.     Chief Complaint: Work Related Injury    W/C: DOI: 06/11/2019 Initial Visit:  Pt complains of right knee pain. 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       Constitution: Negative for chills, fatigue and fever.   HENT: Negative for congestion and sore throat.    Neck: Negative for painful lymph nodes.   Cardiovascular: Negative for chest pain and leg swelling.   Eyes: Negative for double vision and blurred vision.   Respiratory: Negative for cough and shortness of breath.    Gastrointestinal: Negative for nausea, vomiting and diarrhea.   Genitourinary: Negative for dysuria, frequency, urgency and history of kidney stones.   Musculoskeletal: Negative for joint pain, joint swelling, muscle cramps and muscle ache.        Right knee pain   Skin: Negative for color change, pale, rash and bruising.   Allergic/Immunologic: Negative for seasonal allergies.   Neurological: Negative for dizziness, history of vertigo, light-headedness, passing out and headaches.   Hematologic/Lymphatic: Negative for swollen lymph nodes.   Psychiatric/Behavioral: Negative for nervous/anxious, sleep disturbance and depression. The patient is not nervous/anxious.        Objective:      Physical Exam   Constitutional: She is oriented to person, place, and time. Vital signs are normal. She appears well-developed and well-nourished. She is cooperative.  Non-toxic appearance. She does not have a sickly appearance. She does not appear ill. No distress.   HENT:   Head: Normocephalic and atraumatic.   Right Ear: Hearing, " tympanic membrane, external ear and ear canal normal.   Left Ear: Hearing, tympanic membrane, external ear and ear canal normal.   Nose: Nose normal. No mucosal edema, rhinorrhea or nasal deformity. No epistaxis. Right sinus exhibits no maxillary sinus tenderness and no frontal sinus tenderness. Left sinus exhibits no maxillary sinus tenderness and no frontal sinus tenderness.   Mouth/Throat: Uvula is midline, oropharynx is clear and moist and mucous membranes are normal. No trismus in the jaw. Normal dentition. No uvula swelling. No posterior oropharyngeal erythema.   Eyes: Conjunctivae and lids are normal. Right eye exhibits no discharge. Left eye exhibits no discharge. No scleral icterus.   Sclera clear bilat   Neck: Trachea normal, normal range of motion, full passive range of motion without pain and phonation normal. Neck supple.   Cardiovascular: Normal rate, regular rhythm, normal heart sounds, intact distal pulses and normal pulses.   Pulmonary/Chest: Effort normal and breath sounds normal. No respiratory distress.   Abdominal: Soft. Normal appearance and bowel sounds are normal. She exhibits no distension, no pulsatile midline mass and no mass. There is no tenderness.   Musculoskeletal: She exhibits no edema or deformity.        Right knee: She exhibits decreased range of motion and bony tenderness. She exhibits no swelling, no effusion, no ecchymosis, no laceration, no erythema and normal alignment. Tenderness found. Lateral joint line tenderness noted.   Pain with flexion of right knee. Depression noted distal to patellar at site of quadricep tendon, tender to touch at site. Patient is also tender to lateral aspect of right knee.    Lymphadenopathy:     She has no cervical adenopathy.   Neurological: She is alert and oriented to person, place, and time. She exhibits normal muscle tone. Coordination normal. GCS eye subscore is 4. GCS verbal subscore is 5. GCS motor subscore is 6.   Skin: Skin is warm, dry  and intact. No rash noted. She is not diaphoretic. No pallor.   Psychiatric: She has a normal mood and affect. Her speech is normal and behavior is normal. Judgment and thought content normal. Cognition and memory are normal.   Nursing note and vitals reviewed.      Assessment:       1. Acute pain of right knee        Plan:       Xr was negative. Discussed the importance of R.I.C.E. Knee immobilizer and NSAIDs given for pain and inflammation.  Discussed reasons to return and importance of followup. All questions addressed and patient given discharge instructions and followup information.  I do suspect a ligament/tendon tear and will have patient follow up with Orthopedics for further care and MRI. Advised patient no bearing weight to right leg, given prescriptions for crutches. No work until released by orthopedics. Patient declined narcotics.     Acute pain of right knee    Other orders  -     ibuprofen (ADVIL,MOTRIN) 800 MG tablet; Take 1 tablet (800 mg total) by mouth every 8 (eight) hours as needed for Pain.  Dispense: 30 tablet; Refill: 0

## 2019-06-17 DIAGNOSIS — M25.561 RIGHT KNEE PAIN, UNSPECIFIED CHRONICITY: Primary | ICD-10-CM

## 2019-06-18 ENCOUNTER — HOSPITAL ENCOUNTER (OUTPATIENT)
Dept: RADIOLOGY | Facility: HOSPITAL | Age: 44
Discharge: HOME | End: 2019-06-18
Attending: ORTHOPAEDIC SURGERY
Payer: OTHER MISCELLANEOUS

## 2019-06-18 ENCOUNTER — OFFICE VISIT (OUTPATIENT)
Dept: ORTHOPEDICS | Facility: CLINIC | Age: 44
End: 2019-06-18
Payer: OTHER MISCELLANEOUS

## 2019-06-18 VITALS — HEIGHT: 64 IN | RESPIRATION RATE: 20 BRPM | WEIGHT: 181.69 LBS | BODY MASS INDEX: 31.02 KG/M2

## 2019-06-18 DIAGNOSIS — M25.561 RIGHT KNEE PAIN, UNSPECIFIED CHRONICITY: ICD-10-CM

## 2019-06-18 DIAGNOSIS — M25.561 ACUTE PAIN OF RIGHT KNEE: Primary | ICD-10-CM

## 2019-06-18 DIAGNOSIS — M25.561 RIGHT KNEE PAIN, UNSPECIFIED CHRONICITY: Primary | ICD-10-CM

## 2019-06-18 PROCEDURE — 99999 PR PBB SHADOW E&M-EST. PATIENT-LVL III: CPT | Mod: PBBFAC,,, | Performed by: ORTHOPAEDIC SURGERY

## 2019-06-18 PROCEDURE — 73564 X-RAY EXAM KNEE 4 OR MORE: CPT | Mod: 26,RT,, | Performed by: RADIOLOGY

## 2019-06-18 PROCEDURE — 73562 X-RAY EXAM OF KNEE 3: CPT | Mod: 26,59,RT, | Performed by: RADIOLOGY

## 2019-06-18 PROCEDURE — 73562 X-RAY EXAM OF KNEE 3: CPT | Mod: TC,PN,RT

## 2019-06-18 PROCEDURE — 99203 OFFICE O/P NEW LOW 30 MIN: CPT | Mod: 25,S$GLB,, | Performed by: ORTHOPAEDIC SURGERY

## 2019-06-18 PROCEDURE — 73564 XR KNEE ORTHO RIGHT WITH FLEXION: ICD-10-PCS | Mod: 26,RT,, | Performed by: RADIOLOGY

## 2019-06-18 PROCEDURE — 73562 XR KNEE ORTHO RIGHT WITH FLEXION: ICD-10-PCS | Mod: 26,59,RT, | Performed by: RADIOLOGY

## 2019-06-18 PROCEDURE — 20610 LARGE JOINT ASPIRATION/INJECTION: R KNEE: ICD-10-PCS | Mod: RT,S$GLB,, | Performed by: ORTHOPAEDIC SURGERY

## 2019-06-18 PROCEDURE — 99999 PR PBB SHADOW E&M-EST. PATIENT-LVL III: ICD-10-PCS | Mod: PBBFAC,,, | Performed by: ORTHOPAEDIC SURGERY

## 2019-06-18 PROCEDURE — 99203 PR OFFICE/OUTPT VISIT, NEW, LEVL III, 30-44 MIN: ICD-10-PCS | Mod: 25,S$GLB,, | Performed by: ORTHOPAEDIC SURGERY

## 2019-06-18 PROCEDURE — 20610 DRAIN/INJ JOINT/BURSA W/O US: CPT | Mod: RT,S$GLB,, | Performed by: ORTHOPAEDIC SURGERY

## 2019-06-18 RX ORDER — HYDROCODONE BITARTRATE AND ACETAMINOPHEN 5; 325 MG/1; MG/1
1 TABLET ORAL EVERY 6 HOURS PRN
Qty: 30 TABLET | Refills: 0 | Status: SHIPPED | OUTPATIENT
Start: 2019-06-18 | End: 2019-08-20

## 2019-06-18 RX ORDER — PHENTERMINE HYDROCHLORIDE 37.5 MG/1
37.5 CAPSULE ORAL EVERY MORNING
COMMUNITY
End: 2019-08-20

## 2019-06-18 RX ADMIN — TRIAMCINOLONE ACETONIDE 40 MG: 40 INJECTION, SUSPENSION INTRA-ARTICULAR; INTRAMUSCULAR at 08:06

## 2019-06-20 RX ORDER — TRIAMCINOLONE ACETONIDE 40 MG/ML
40 INJECTION, SUSPENSION INTRA-ARTICULAR; INTRAMUSCULAR
Status: DISCONTINUED | OUTPATIENT
Start: 2019-06-18 | End: 2019-06-20 | Stop reason: HOSPADM

## 2019-06-21 NOTE — PROGRESS NOTES
History reviewed. No pertinent past medical history.    Past Surgical History:   Procedure Laterality Date    CHOLECYSTECTOMY      CYSTOSCOPY WITH STENT REMOVAL - make first pt Left 8/21/2017    Performed by [facility]ilsa [facility]cock [facility] at [facility]    Gastric sleeve      TUBAL LIGATION         Current Outpatient Medications   Medication Sig    acetaminophen (TYLENOL) 650 MG TbSR Take 650 mg by mouth every 8 (eight) hours.    ibuprofen (ADVIL,MOTRIN) 800 MG tablet Take 800 mg by mouth 3 (three) times daily.    lisinopril (PRINIVIL,ZESTRIL) 40 MG tablet Take 40 mg by mouth once daily.    phentermine 37.5 MG capsule Take 37.5 mg by mouth every morning.    topiramate (TOPAMAX) 100 MG tablet Take 200 mg by mouth 2 (two) times daily.    venlafaxine (EFFEXOR-XR) 150 MG Cp24 Take 150 mg by mouth once daily.     HYDROcodone-acetaminophen (NORCO) 5-325 mg per tablet Take 1 tablet by mouth every 6 (six) hours as needed for Pain.    ibuprofen (ADVIL,MOTRIN) 800 MG tablet Take 1 tablet (800 mg total) by mouth every 8 (eight) hours as needed for Pain.     No current facility-administered medications for this visit.        Review of patient's allergies indicates:  No Known Allergies    History reviewed. No pertinent family history.    Social History     Socioeconomic History    Marital status:      Spouse name: Not on file    Number of children: Not on file    Years of education: Not on file    Highest education level: Not on file   Occupational History    Not on file   Social Needs    Financial resource strain: Not on file    Food insecurity:     Worry: Not on file     Inability: Not on file    Transportation needs:     Medical: Not on file     Non-medical: Not on file   Tobacco Use    Smoking status: Never Smoker    Smokeless tobacco: Never Used   Substance and Sexual Activity    Alcohol use: No    Drug use: No    Sexual activity: Yes     Partners: Male   Lifestyle    Physical activity:     Days  "per week: Not on file     Minutes per session: Not on file    Stress: Not on file   Relationships    Social connections:     Talks on phone: Not on file     Gets together: Not on file     Attends [employer] service: Not on file     Active member of club or organization: Not on file     Attends meetings of clubs or organizations: Not on file     Relationship status: Not on file   Other Topics Concern    Not on file   Social History Narrative    Not on file       Chief Complaint:   Chief Complaint   Patient presents with    Right Knee - Pain       Consulting Physician: Self, Aaareferral    History of present illness:    This is a 44 y.o. year old female who complains of left knee pain since injury at work lifting patient 6-11-19. Felt pop as knee twisted. Sudden sharp pain. 7/10 and worse with use. Now knee gives out.    Review of Systems:    Constitution: Denies chills, fever, and sweats.  HENT: Denies headaches or blurry vision.  Cardiovascular: Denies chest pain or irregular heart beat.  Respiratory: Denies cough or shortness of breath.  Gastrointestinal: Denies abdominal pain, nausea, or vomiting.  Musculoskeletal:  Denies muscle cramps.  Neurological: Denies dizziness or focal weakness.  Psychiatric/Behavioral: Normal mental status.  Hematologic/Lymphatic: Denies bleeding problem or easy bruising/bleeding.  Skin: Denies rash or suspicious lesions.    Examination:    Vital Signs:    Vitals:    06/18/19 1533   Resp: 20   Weight: 82.4 kg (181 lb 10.5 oz)   Height: 5' 4" (1.626 m)   PainSc:   7   PainLoc: Knee       Body mass index is 31.18 kg/m².    This a well-developed, well nourished patient in no acute distress.    Alert and oriented x 3 and cooperative to examination.       Physical Exam: Right Knee Exam    Gait   Normal    Skin  Rash:   None  Scars:   None    Inspection  Erythema:  None  Bruising:  None  Effusion:  mild  Masses:  None  Lymphadenopathy: None    Range of Motion: 0 to 130° with pain    Medial " Joint : no  Lateral Joint : yes    Patellofemoral Tenderness: None  Patellofemoral Crepitus: None    Lachman:  Normal  Anterior Drawer: Normal  Posterior Drawer: Normal    Barbi's:  +  Apley's:  +    Varus Stress:  Stable  Valgus Stress:  Stable    Strength:  5/5    Pulses:  Palpable distal    Sensation:  Intact          Imaging: X-rays ordered and images interpreted today personally by me of right knee appears normal.        Assessment: Acute pain of right knee  -     Large Joint Aspiration/Injection: R knee        Plan:  Calf and knee pain. Likely meniscus. Will Brace and inject and MRI. Norco rx.      DISCLAIMER: This note may have been dictated using voice recognition software and may contain grammatical errors.     NOTE: Consult report sent to referring provider via [employer] EMR.

## 2019-06-21 NOTE — PROCEDURES
Large Joint Aspiration/Injection: R knee  Date/Time: 6/18/2019 8:36 PM  Performed by: Sd Trujillo MD  Authorized by: Sd Trujillo MD     Consent Done?:  Yes (Verbal)  Indications:  Pain  Timeout: Prior to procedure the correct patient, procedure, and site was verified      Location:  Knee  Site:  R knee  Prep: Patient was prepped and draped in usual sterile fashion    Approach:  Anteromedial  Medications:  40 mg triamcinolone acetonide 40 mg/mL

## 2019-07-02 ENCOUNTER — HOSPITAL ENCOUNTER (OUTPATIENT)
Dept: RADIOLOGY | Facility: HOSPITAL | Age: 44
Discharge: HOME | End: 2019-07-02
Attending: ORTHOPAEDIC SURGERY
Payer: OTHER MISCELLANEOUS

## 2019-07-02 DIAGNOSIS — M25.561 RIGHT KNEE PAIN, UNSPECIFIED CHRONICITY: ICD-10-CM

## 2019-07-02 PROCEDURE — 73721 MRI JNT OF LWR EXTRE W/O DYE: CPT | Mod: 26,RT,, | Performed by: RADIOLOGY

## 2019-07-02 PROCEDURE — 73721 MRI JNT OF LWR EXTRE W/O DYE: CPT | Mod: TC,RT

## 2019-07-02 PROCEDURE — 73721 MRI KNEE WITHOUT CONTRAST RIGHT: ICD-10-PCS | Mod: 26,RT,, | Performed by: RADIOLOGY

## 2019-07-05 ENCOUNTER — OFFICE VISIT (OUTPATIENT)
Dept: ORTHOPEDICS | Facility: CLINIC | Age: 44
End: 2019-07-05
Payer: OTHER MISCELLANEOUS

## 2019-07-05 VITALS
BODY MASS INDEX: 30.9 KG/M2 | HEART RATE: 105 BPM | WEIGHT: 181 LBS | DIASTOLIC BLOOD PRESSURE: 76 MMHG | HEIGHT: 64 IN | SYSTOLIC BLOOD PRESSURE: 163 MMHG

## 2019-07-05 DIAGNOSIS — S83.511A RUPTURE OF ANTERIOR CRUCIATE LIGAMENT OF RIGHT KNEE, INITIAL ENCOUNTER: Primary | ICD-10-CM

## 2019-07-05 DIAGNOSIS — M25.561 ACUTE PAIN OF RIGHT KNEE: ICD-10-CM

## 2019-07-05 DIAGNOSIS — S83.511A RIGHT ACL TEAR: ICD-10-CM

## 2019-07-05 PROCEDURE — 99214 PR OFFICE/OUTPT VISIT, EST, LEVL IV, 30-39 MIN: ICD-10-PCS | Mod: S$GLB,,, | Performed by: ORTHOPAEDIC SURGERY

## 2019-07-05 PROCEDURE — 99999 PR PBB SHADOW E&M-EST. PATIENT-LVL III: ICD-10-PCS | Mod: PBBFAC,,, | Performed by: ORTHOPAEDIC SURGERY

## 2019-07-05 PROCEDURE — 99999 PR PBB SHADOW E&M-EST. PATIENT-LVL III: CPT | Mod: PBBFAC,,, | Performed by: ORTHOPAEDIC SURGERY

## 2019-07-05 PROCEDURE — 99214 OFFICE O/P EST MOD 30 MIN: CPT | Mod: S$GLB,,, | Performed by: ORTHOPAEDIC SURGERY

## 2019-07-05 RX ORDER — CEFAZOLIN SODIUM 2 G/50ML
2 SOLUTION INTRAVENOUS
Status: CANCELLED | OUTPATIENT
Start: 2019-07-05

## 2019-07-05 RX ORDER — MUPIROCIN 20 MG/G
OINTMENT TOPICAL
Status: CANCELLED | OUTPATIENT
Start: 2019-07-05

## 2019-07-05 RX ORDER — IBUPROFEN 800 MG/1
800 TABLET ORAL 3 TIMES DAILY
Qty: 60 TABLET | Refills: 0 | Status: SHIPPED | OUTPATIENT
Start: 2019-07-05 | End: 2019-08-20

## 2019-07-05 RX ORDER — SODIUM CHLORIDE 9 MG/ML
INJECTION, SOLUTION INTRAVENOUS CONTINUOUS
Status: CANCELLED | OUTPATIENT
Start: 2019-07-05

## 2019-07-09 NOTE — PROGRESS NOTES
History reviewed. No pertinent past medical history.    Past Surgical History:   Procedure Laterality Date    CHOLECYSTECTOMY      CYSTOSCOPY WITH STENT REMOVAL - make first pt Left 8/21/2017    Performed [facility] at [facility] OR    Gastric sleeve      TUBAL LIGATION         Current Outpatient Medications   Medication Sig    acetaminophen (TYLENOL) 650 MG TbSR Take 650 mg by mouth every 8 (eight) hours.    HYDROcodone-acetaminophen (NORCO) 5-325 mg per tablet Take 1 tablet by mouth every 6 (six) hours as needed for Pain.    lisinopril (PRINIVIL,ZESTRIL) 40 MG tablet Take 40 mg by mouth once daily.    phentermine 37.5 MG capsule Take 37.5 mg by mouth every morning.    topiramate (TOPAMAX) 100 MG tablet Take 200 mg by mouth 2 (two) times daily.    venlafaxine (EFFEXOR-XR) 150 MG Cp24 Take 150 mg by mouth once daily.     ibuprofen (ADVIL,MOTRIN) 800 MG tablet Take 1 tablet (800 mg total) by mouth 3 (three) times daily.     No current facility-administered medications for this visit.        Review of patient's allergies indicates:  No Known Allergies    History reviewed. No pertinent family history.    Social History     Socioeconomic History    Marital status:      Spouse name: Not on file    Number of children: Not on file    Years of education: Not on file    Highest education level: Not on file   Occupational History    Not on file   Social Needs    Financial resource strain: Not on file    Food insecurity:     Worry: Not on file     Inability: Not on file    Transportation needs:     Medical: Not on file     Non-medical: Not on file   Tobacco Use    Smoking status: Never Smoker    Smokeless tobacco: Never Used   Substance and Sexual Activity    Alcohol use: No    Drug use: No    Sexual activity: Yes     Partners: Male   Lifestyle    Physical activity:     Days per week: Not on file     Minutes per session: Not on file    Stress: Not on file   Relationships     "Social connections:     Talks on phone: Not on file     Gets together: Not on file     Attends Anabaptism service: Not on file     Active member of club or organization: Not on file     Attends meetings of clubs or organizations: Not on file     Relationship status: Not on file   Other Topics Concern    Not on file   Social History Narrative    Not on file       Chief Complaint:   Chief Complaint   Patient presents with    Knee Pain     right knee MRI results       Consulting Physician: Sd Trujillo MD    History of present illness:    This is a 44 y.o. year old female who complains of left knee pain since injury at work lifting patient 6-11-19. Felt pop as knee twisted. Sudden sharp pain. 6/10 and worse with use. Now knee gives out.    Review of Systems:    Constitution: Denies chills, fever, and sweats.  HENT: Denies headaches or blurry vision.  Cardiovascular: Denies chest pain or irregular heart beat.  Respiratory: Denies cough or shortness of breath.  Gastrointestinal: Denies abdominal pain, nausea, or vomiting.  Musculoskeletal:  Denies muscle cramps.  Neurological: Denies dizziness or focal weakness.  Psychiatric/Behavioral: Normal mental status.  Hematologic/Lymphatic: Denies bleeding problem or easy bruising/bleeding.  Skin: Denies rash or suspicious lesions.    Examination:    Vital Signs:    Vitals:    07/05/19 1013   BP: (!) 163/76   Pulse: 105   Weight: 82.1 kg (181 lb)   Height: 5' 4" (1.626 m)   PainSc:   6   PainLoc: Knee       Body mass index is 31.07 kg/m².    This a well-developed, well nourished patient in no acute distress.    Alert and oriented x 3 and cooperative to examination.       Physical Exam: Right Knee Exam    Gait   Normal    Skin  Rash:   None  Scars:   None    Inspection  Erythema:  None  Bruising:  None  Effusion:  mild  Masses:  None  Lymphadenopathy: None    Range of Motion: 0 to 130° with pain    Medial Joint : no  Lateral Joint Line " Tender: yes    Patellofemoral Tenderness: None  Patellofemoral Crepitus: None    Lachman:  1+  Anterior Drawer: 1+  Posterior Drawer: Normal    Barbi's:  +  Apley's:  +    Varus Stress:  Stable  Valgus Stress:  Stable    Strength:  4+/5    Pulses:  Palpable distal    Sensation:  Intact          Imaging: X-rays of right knee appears normal. The MRI study images that were interpreted personally by me today and reviewed with the patient demonstrate full thickness ACL tear and bone bruising.         Assessment: Rupture of anterior cruciate ligament of right knee, initial encounter  -     Case Request Operating Room: RECONSTRUCTION, KNEE, ACL, USING GRAFT    Right ACL tear    Acute pain of right knee        Plan:  We discussed options and she elected to proceed with ACL reconstruction using a BTB Allograft. After discussing the diagnosis and reviewing treatment options, the patient/family elected to proceed with surgical intervention.    In preparation for surgery:    Appropriate pre-operative labs were ordered.    An EKG examination was ordered.    A chest X-ray was ordered.    Pre-operative antibiotics were ordered.    The risks, benefits, and alternatives to the procedure were explained to the patient/family including, but not limited to: incomplete pain relief, surgical failure, hardware failure, need for further procedures, damage to nerves, arteries, blood vessels and other structures, infection, Deep Vein Thrombosis (DVT), Pulmonary Embolus (PE), Complex Regional Pain Syndrome as well as general anesthetic complications including seizure, stroke, heart attack and even death. The patient/family understood these risks and wished to proceed and signed the informed consent. All questions were answered. No guarantees were implied or stated.    We will obtain the necessary clearances and schedule the patient for surgery.          DISCLAIMER: This note may have been dictated using voice recognition software and may  contain grammatical errors.     NOTE: Consult report sent to referring provider via [employer] EMR.

## 2019-07-26 ENCOUNTER — TELEPHONE (OUTPATIENT)
Dept: ORTHOPEDICS | Facility: CLINIC | Age: 44
End: 2019-07-26

## 2019-08-02 ENCOUNTER — TELEPHONE (OUTPATIENT)
Dept: ORTHOPEDICS | Facility: CLINIC | Age: 44
End: 2019-08-02

## 2019-08-02 ENCOUNTER — PATIENT MESSAGE (OUTPATIENT)
Dept: ORTHOPEDICS | Facility: CLINIC | Age: 44
End: 2019-08-02

## 2019-08-02 NOTE — TELEPHONE ENCOUNTER
----- Message from Kassi Drake sent at 8/2/2019  2:29 PM CDT -----  Contact: self  Patient needs a rx for pain meds her knee popped out of place. She is scheduled for surgery on 8/8

## 2019-08-07 ENCOUNTER — TELEPHONE (OUTPATIENT)
Dept: ORTHOPEDICS | Facility: CLINIC | Age: 44
End: 2019-08-07

## 2019-08-07 RX ORDER — TRAMADOL HYDROCHLORIDE 50 MG/1
50 TABLET ORAL EVERY 6 HOURS PRN
Qty: 30 TABLET | Refills: 0 | Status: SHIPPED | OUTPATIENT
Start: 2019-08-07 | End: 2020-03-03 | Stop reason: SDUPTHER

## 2019-08-07 NOTE — TELEPHONE ENCOUNTER
Spoke to patient. Advised her insurance is wanting her to try Physical Therapy first. Advised will cancel surgery tomorrow. Phone number for [facility] given to patient to call and schedule first appointment. Verbalized understanding.

## 2019-08-07 NOTE — TELEPHONE ENCOUNTER
----- Message from Yoli Leon sent at 8/7/2019 11:54 AM CDT -----  Contact: Patient /  [phone]  Patient is calling to cancel her surgery for tomorrow.  Does not know if it is approved or not by workmen's comp.  Please call patient.

## 2019-08-07 NOTE — TELEPHONE ENCOUNTER
Pt requesting pain medicine for her knee. Insurance denied surgery for tomorrow (ACL Reconstruction), because they would like her to try PT first.     Please advise.

## 2019-08-07 NOTE — TELEPHONE ENCOUNTER
Spoke to patient. Advised medication has been sent in to her pharmacy. Also advised per workers comp dept, authorization has already been approved for [facility] will need to continue there. Advised can change location for next round of PT. Verbalized understanding

## 2019-08-08 ENCOUNTER — TELEPHONE (OUTPATIENT)
Dept: ORTHOPEDICS | Facility: CLINIC | Age: 44
End: 2019-08-08

## 2019-08-08 DIAGNOSIS — S83.511A RUPTURE OF ANTERIOR CRUCIATE LIGAMENT OF RIGHT KNEE, INITIAL ENCOUNTER: Primary | ICD-10-CM

## 2019-08-08 DIAGNOSIS — S83.511A RIGHT ACL TEAR: ICD-10-CM

## 2019-08-08 RX ORDER — CEFAZOLIN SODIUM 2 G/50ML
2 SOLUTION INTRAVENOUS
Status: CANCELLED | OUTPATIENT
Start: 2019-08-08

## 2019-08-08 RX ORDER — SODIUM CHLORIDE 9 MG/ML
INJECTION, SOLUTION INTRAVENOUS CONTINUOUS
Status: CANCELLED | OUTPATIENT
Start: 2019-08-08

## 2019-08-08 RX ORDER — MUPIROCIN 20 MG/G
OINTMENT TOPICAL
Status: CANCELLED | OUTPATIENT
Start: 2019-08-08

## 2019-08-08 NOTE — TELEPHONE ENCOUNTER
Called pt to advise that we have just rcvd approval from [facility] for her surgery.  We will reschedule for 8/22/19.  I will fax approval to w/c.     Chief complaint: Patient with anxiety, depression and disrupted sleep    Current status: The patient notes that she is sleeping better with the temazepam. Her mood was up and down over the weekend. She feels this is likely mostly due to not being with her son who is with his father during her treatment in [facility]. She notes she is used to having him \"24/7\". She has lost some weight since being in the [facility]. She is eating a little better and isn't so anxious about this. She doesn't have any nausea, she eats too much.    Mental status exam: The patient is well-groomed and has a steady gait. She is alert, oriented ×3 with intact recent and remote memory. Normal attention span. Insight and judgment intact. Speech normal rate. Affect animated and mood euthymic. Normal rate of thoughts. No negativity or suicidal ideation.    Assessment: Axis I: Major depression single episode moderate Axis II: None Axis III: See problem list Axis IV: Mild to moderate Axis V: 50    Plan: Continue current medications

## 2019-08-21 ENCOUNTER — ANESTHESIA EVENT (OUTPATIENT)
Dept: SURGERY | Facility: HOSPITAL | Age: 44
End: 2019-08-21
Payer: OTHER MISCELLANEOUS

## 2019-08-22 ENCOUNTER — HOSPITAL ENCOUNTER (OUTPATIENT)
Facility: HOSPITAL | Age: 44
Discharge: HOME | End: 2019-08-22
Attending: ORTHOPAEDIC SURGERY | Admitting: ORTHOPAEDIC SURGERY
Payer: OTHER MISCELLANEOUS

## 2019-08-22 ENCOUNTER — ANESTHESIA (OUTPATIENT)
Dept: SURGERY | Facility: HOSPITAL | Age: 44
End: 2019-08-22
Payer: OTHER MISCELLANEOUS

## 2019-08-22 VITALS
HEIGHT: 64 IN | DIASTOLIC BLOOD PRESSURE: 82 MMHG | OXYGEN SATURATION: 97 % | BODY MASS INDEX: 28.51 KG/M2 | SYSTOLIC BLOOD PRESSURE: 141 MMHG | WEIGHT: 167 LBS | RESPIRATION RATE: 20 BRPM | HEART RATE: 102 BPM | TEMPERATURE: 99 F

## 2019-08-22 DIAGNOSIS — S83.511A RIGHT ACL TEAR: ICD-10-CM

## 2019-08-22 DIAGNOSIS — S83.511A RUPTURE OF ANTERIOR CRUCIATE LIGAMENT OF RIGHT KNEE, INITIAL ENCOUNTER: ICD-10-CM

## 2019-08-22 LAB
ANION GAP SERPL CALC-SCNC: 8 MMOL/L (ref 8–16)
B-HCG UR QL: NEGATIVE
BACTERIA #/AREA URNS HPF: ABNORMAL /HPF
BASOPHILS # BLD AUTO: 0.04 K/UL (ref 0–0.2)
BASOPHILS NFR BLD: 0.6 % (ref 0–1.9)
BILIRUB UR QL STRIP: NEGATIVE
BUN SERPL-MCNC: 10 MG/DL (ref 6–20)
CALCIUM SERPL-MCNC: 9.1 MG/DL (ref 8.7–10.5)
CAOX CRY URNS QL MICRO: ABNORMAL
CHLORIDE SERPL-SCNC: 109 MMOL/L (ref 95–110)
CLARITY UR: CLEAR
CO2 SERPL-SCNC: 21 MMOL/L (ref 23–29)
COLOR UR: YELLOW
CREAT SERPL-MCNC: 1.2 MG/DL (ref 0.5–1.4)
CTP QC/QA: YES
DIFFERENTIAL METHOD: ABNORMAL
EOSINOPHIL # BLD AUTO: 0.1 K/UL (ref 0–0.5)
EOSINOPHIL NFR BLD: 1.8 % (ref 0–8)
ERYTHROCYTE [DISTWIDTH] IN BLOOD BY AUTOMATED COUNT: 14.8 % (ref 11.5–14.5)
EST. GFR  (AFRICAN AMERICAN): >60 ML/MIN/1.73 M^2
EST. GFR  (NON AFRICAN AMERICAN): 56 ML/MIN/1.73 M^2
GLUCOSE SERPL-MCNC: 89 MG/DL (ref 70–110)
GLUCOSE UR QL STRIP: NEGATIVE
HCT VFR BLD AUTO: 32.3 % (ref 37–48.5)
HGB BLD-MCNC: 9.9 G/DL (ref 12–16)
HGB UR QL STRIP: NEGATIVE
HYALINE CASTS #/AREA URNS LPF: 8 /LPF
IMM GRANULOCYTES # BLD AUTO: 0.02 K/UL (ref 0–0.04)
KETONES UR QL STRIP: NEGATIVE
LEUKOCYTE ESTERASE UR QL STRIP: NEGATIVE
LYMPHOCYTES # BLD AUTO: 1.8 K/UL (ref 1–4.8)
LYMPHOCYTES NFR BLD: 26.7 % (ref 18–48)
MCH RBC QN AUTO: 25.3 PG (ref 27–31)
MCHC RBC AUTO-ENTMCNC: 30.7 G/DL (ref 32–36)
MCV RBC AUTO: 83 FL (ref 82–98)
MICROSCOPIC COMMENT: ABNORMAL
MONOCYTES # BLD AUTO: 0.6 K/UL (ref 0.3–1)
MONOCYTES NFR BLD: 8.2 % (ref 4–15)
NEUTROPHILS # BLD AUTO: 4.3 K/UL (ref 1.8–7.7)
NEUTROPHILS NFR BLD: 62.4 % (ref 38–73)
NITRITE UR QL STRIP: POSITIVE
NRBC BLD-RTO: 0 /100 WBC
PH UR STRIP: 7 [PH] (ref 5–8)
PLATELET # BLD AUTO: 311 K/UL (ref 150–350)
PMV BLD AUTO: 9.3 FL (ref 9.2–12.9)
POTASSIUM SERPL-SCNC: 3.5 MMOL/L (ref 3.5–5.1)
PROT UR QL STRIP: NEGATIVE
RBC # BLD AUTO: 3.91 M/UL (ref 4–5.4)
RBC #/AREA URNS HPF: 2 /HPF (ref 0–4)
SODIUM SERPL-SCNC: 138 MMOL/L (ref 136–145)
SP GR UR STRIP: 1.02 (ref 1–1.03)
SQUAMOUS #/AREA URNS HPF: 10 /HPF
URN SPEC COLLECT METH UR: ABNORMAL
UROBILINOGEN UR STRIP-ACNC: NEGATIVE EU/DL
WBC # BLD AUTO: 6.81 K/UL (ref 3.9–12.7)
WBC #/AREA URNS HPF: 1 /HPF (ref 0–5)

## 2019-08-22 PROCEDURE — 64447 PR NERVE BLOCK INJ, ANES/STEROID, FEMORAL, INCL IMAG GUIDANCE: ICD-10-PCS | Mod: 59,RT,, | Performed by: ANESTHESIOLOGY

## 2019-08-22 PROCEDURE — D9220A PRA ANESTHESIA: ICD-10-PCS | Mod: ,,, | Performed by: ANESTHESIOLOGY

## 2019-08-22 PROCEDURE — 81000 URINALYSIS NONAUTO W/SCOPE: CPT

## 2019-08-22 PROCEDURE — 25000003 PHARM REV CODE 250: Performed by: ANESTHESIOLOGY

## 2019-08-22 PROCEDURE — 93010 ELECTROCARDIOGRAM REPORT: CPT | Mod: ,,, | Performed by: INTERNAL MEDICINE

## 2019-08-22 PROCEDURE — 71000015 HC POSTOP RECOV 1ST HR: Performed by: ORTHOPAEDIC SURGERY

## 2019-08-22 PROCEDURE — 71000039 HC RECOVERY, EACH ADD'L HOUR: Performed by: ORTHOPAEDIC SURGERY

## 2019-08-22 PROCEDURE — 29888 ARTHRS AID ACL RPR/AGMNTJ: CPT | Mod: RT,,, | Performed by: ORTHOPAEDIC SURGERY

## 2019-08-22 PROCEDURE — 93005 ELECTROCARDIOGRAM TRACING: CPT

## 2019-08-22 PROCEDURE — 99900103 DSU ONLY-NO CHARGE-INITIAL HR (STAT): Performed by: ORTHOPAEDIC SURGERY

## 2019-08-22 PROCEDURE — 36000711: Performed by: ORTHOPAEDIC SURGERY

## 2019-08-22 PROCEDURE — 27200750 HC INSULATED NEEDLE/ STIMUPLEX: Performed by: ANESTHESIOLOGY

## 2019-08-22 PROCEDURE — 37000009 HC ANESTHESIA EA ADD 15 MINS: Performed by: ORTHOPAEDIC SURGERY

## 2019-08-22 PROCEDURE — 64447 NJX AA&/STRD FEMORAL NRV IMG: CPT | Performed by: ANESTHESIOLOGY

## 2019-08-22 PROCEDURE — 27800903 OPTIME MED/SURG SUP & DEVICES OTHER IMPLANTS: Performed by: ORTHOPAEDIC SURGERY

## 2019-08-22 PROCEDURE — S0020 INJECTION, BUPIVICAINE HYDRO: HCPCS | Performed by: ANESTHESIOLOGY

## 2019-08-22 PROCEDURE — 27201423 OPTIME MED/SURG SUP & DEVICES STERILE SUPPLY: Performed by: ORTHOPAEDIC SURGERY

## 2019-08-22 PROCEDURE — 63600175 PHARM REV CODE 636 W HCPCS: Performed by: ORTHOPAEDIC SURGERY

## 2019-08-22 PROCEDURE — 37000008 HC ANESTHESIA 1ST 15 MINUTES: Performed by: ORTHOPAEDIC SURGERY

## 2019-08-22 PROCEDURE — 63600175 PHARM REV CODE 636 W HCPCS: Performed by: ANESTHESIOLOGY

## 2019-08-22 PROCEDURE — 99900104 DSU ONLY-NO CHARGE-EA ADD'L HR (STAT): Performed by: ORTHOPAEDIC SURGERY

## 2019-08-22 PROCEDURE — 71000033 HC RECOVERY, INTIAL HOUR: Performed by: ORTHOPAEDIC SURGERY

## 2019-08-22 PROCEDURE — 71000016 HC POSTOP RECOV ADDL HR: Performed by: ORTHOPAEDIC SURGERY

## 2019-08-22 PROCEDURE — 63600175 PHARM REV CODE 636 W HCPCS: Performed by: NURSE ANESTHETIST, CERTIFIED REGISTERED

## 2019-08-22 PROCEDURE — 85025 COMPLETE CBC W/AUTO DIFF WBC: CPT

## 2019-08-22 PROCEDURE — 76942 ECHO GUIDE FOR BIOPSY: CPT | Mod: 26,,, | Performed by: ANESTHESIOLOGY

## 2019-08-22 PROCEDURE — 80048 BASIC METABOLIC PNL TOTAL CA: CPT

## 2019-08-22 PROCEDURE — 25000003 PHARM REV CODE 250: Performed by: ORTHOPAEDIC SURGERY

## 2019-08-22 PROCEDURE — D9220A PRA ANESTHESIA: Mod: ,,, | Performed by: ANESTHESIOLOGY

## 2019-08-22 PROCEDURE — 81025 URINE PREGNANCY TEST: CPT | Performed by: ANESTHESIOLOGY

## 2019-08-22 PROCEDURE — 76942 PR U/S GUIDANCE FOR NEEDLE GUIDANCE: ICD-10-PCS | Mod: 26,,, | Performed by: ANESTHESIOLOGY

## 2019-08-22 PROCEDURE — 36415 COLL VENOUS BLD VENIPUNCTURE: CPT

## 2019-08-22 PROCEDURE — C9290 INJ, BUPIVACAINE LIPOSOME: HCPCS | Performed by: ANESTHESIOLOGY

## 2019-08-22 PROCEDURE — 63600175 PHARM REV CODE 636 W HCPCS

## 2019-08-22 PROCEDURE — 93010 EKG 12-LEAD: ICD-10-PCS | Mod: ,,, | Performed by: INTERNAL MEDICINE

## 2019-08-22 PROCEDURE — 64447 NJX AA&/STRD FEMORAL NRV IMG: CPT | Mod: 59,RT,, | Performed by: ANESTHESIOLOGY

## 2019-08-22 PROCEDURE — 29888 PR KNEE SCOPE,AID ANT CRUCIATE REPAIR: ICD-10-PCS | Mod: RT,,, | Performed by: ORTHOPAEDIC SURGERY

## 2019-08-22 PROCEDURE — 25000003 PHARM REV CODE 250

## 2019-08-22 PROCEDURE — 36000710: Performed by: ORTHOPAEDIC SURGERY

## 2019-08-22 PROCEDURE — C1713 ANCHOR/SCREW BN/BN,TIS/BN: HCPCS | Performed by: ORTHOPAEDIC SURGERY

## 2019-08-22 DEVICE — BTB HEMI W/SHAPED BONE BLOCKS: Type: IMPLANTABLE DEVICE | Site: KNEE | Status: FUNCTIONAL

## 2019-08-22 DEVICE — SCREW MILAGRO 9 X 23MM: Type: IMPLANTABLE DEVICE | Site: KNEE | Status: FUNCTIONAL

## 2019-08-22 DEVICE — SCREW MILAGRO 8X23MM: Type: IMPLANTABLE DEVICE | Site: KNEE | Status: FUNCTIONAL

## 2019-08-22 RX ORDER — FENTANYL CITRATE 50 UG/ML
INJECTION, SOLUTION INTRAMUSCULAR; INTRAVENOUS
Status: DISCONTINUED | OUTPATIENT
Start: 2019-08-22 | End: 2019-08-22

## 2019-08-22 RX ORDER — SODIUM CHLORIDE 0.9 % (FLUSH) 0.9 %
3 SYRINGE (ML) INJECTION EVERY 8 HOURS
Status: ACTIVE | OUTPATIENT
Start: 2019-08-22

## 2019-08-22 RX ORDER — CEFAZOLIN SODIUM 2 G/50ML
2 SOLUTION INTRAVENOUS
Status: COMPLETED | OUTPATIENT
Start: 2019-08-22 | End: 2019-08-22

## 2019-08-22 RX ORDER — IBUPROFEN 400 MG/1
800 TABLET ORAL 3 TIMES DAILY
Status: CANCELLED | OUTPATIENT
Start: 2019-08-22

## 2019-08-22 RX ORDER — IBUPROFEN 800 MG/1
800 TABLET ORAL 3 TIMES DAILY
Qty: 60 TABLET | Refills: 0 | Status: SHIPPED | OUTPATIENT
Start: 2019-08-22

## 2019-08-22 RX ORDER — EPINEPHRINE 1 MG/ML
INJECTION, SOLUTION INTRACARDIAC; INTRAMUSCULAR; INTRAVENOUS; SUBCUTANEOUS
Status: DISCONTINUED | OUTPATIENT
Start: 2019-08-22 | End: 2019-08-22 | Stop reason: HOSPADM

## 2019-08-22 RX ORDER — PROPOFOL 10 MG/ML
VIAL (ML) INTRAVENOUS
Status: DISCONTINUED | OUTPATIENT
Start: 2019-08-22 | End: 2019-08-22

## 2019-08-22 RX ORDER — OXYCODONE AND ACETAMINOPHEN 10; 325 MG/1; MG/1
1 TABLET ORAL EVERY 6 HOURS PRN
Qty: 28 TABLET | Refills: 0 | Status: SHIPPED | OUTPATIENT
Start: 2019-08-22

## 2019-08-22 RX ORDER — BUPIVACAINE HYDROCHLORIDE 5 MG/ML
INJECTION, SOLUTION EPIDURAL; INTRACAUDAL
Status: COMPLETED | OUTPATIENT
Start: 2019-08-22 | End: 2019-08-22

## 2019-08-22 RX ORDER — LIDOCAINE HYDROCHLORIDE 10 MG/ML
1 INJECTION, SOLUTION EPIDURAL; INFILTRATION; INTRACAUDAL; PERINEURAL ONCE
Status: COMPLETED | OUTPATIENT
Start: 2019-08-22 | End: 2019-08-22

## 2019-08-22 RX ORDER — HYDROCODONE BITARTRATE AND ACETAMINOPHEN 10; 325 MG/1; MG/1
1 TABLET ORAL EVERY 4 HOURS PRN
Status: CANCELLED | OUTPATIENT
Start: 2019-08-22

## 2019-08-22 RX ORDER — LIDOCAINE HCL/PF 100 MG/5ML
SYRINGE (ML) INTRAVENOUS
Status: DISCONTINUED | OUTPATIENT
Start: 2019-08-22 | End: 2019-08-22

## 2019-08-22 RX ORDER — ONDANSETRON HYDROCHLORIDE 2 MG/ML
INJECTION, SOLUTION INTRAMUSCULAR; INTRAVENOUS
Status: DISCONTINUED | OUTPATIENT
Start: 2019-08-22 | End: 2019-08-22

## 2019-08-22 RX ORDER — OXYCODONE HYDROCHLORIDE 5 MG/1
5 TABLET ORAL ONCE AS NEEDED
Status: COMPLETED | OUTPATIENT
Start: 2019-08-22 | End: 2019-08-22

## 2019-08-22 RX ORDER — OXYCODONE HYDROCHLORIDE 5 MG/1
5 TABLET ORAL ONCE
Status: COMPLETED | OUTPATIENT
Start: 2019-08-22 | End: 2019-08-22

## 2019-08-22 RX ORDER — SODIUM CHLORIDE, SODIUM LACTATE, POTASSIUM CHLORIDE, CALCIUM CHLORIDE 600; 310; 30; 20 MG/100ML; MG/100ML; MG/100ML; MG/100ML
10 INJECTION, SOLUTION INTRAVENOUS CONTINUOUS
Status: ACTIVE | OUTPATIENT
Start: 2019-08-22

## 2019-08-22 RX ORDER — SODIUM CHLORIDE, SODIUM LACTATE, POTASSIUM CHLORIDE, CALCIUM CHLORIDE 600; 310; 30; 20 MG/100ML; MG/100ML; MG/100ML; MG/100ML
500 INJECTION, SOLUTION INTRAVENOUS ONCE
Status: COMPLETED | OUTPATIENT
Start: 2019-08-22 | End: 2019-08-22

## 2019-08-22 RX ORDER — FENTANYL CITRATE 50 UG/ML
25 INJECTION, SOLUTION INTRAMUSCULAR; INTRAVENOUS EVERY 5 MIN PRN
Status: COMPLETED | OUTPATIENT
Start: 2019-08-22 | End: 2019-08-22

## 2019-08-22 RX ORDER — ONDANSETRON 2 MG/ML
4 INJECTION INTRAMUSCULAR; INTRAVENOUS ONCE AS NEEDED
Status: DISCONTINUED | OUTPATIENT
Start: 2019-08-22 | End: 2019-08-22 | Stop reason: HOSPADM

## 2019-08-22 RX ORDER — DEXAMETHASONE SODIUM PHOSPHATE 4 MG/ML
INJECTION, SOLUTION INTRA-ARTICULAR; INTRALESIONAL; INTRAMUSCULAR; INTRAVENOUS; SOFT TISSUE
Status: DISCONTINUED | OUTPATIENT
Start: 2019-08-22 | End: 2019-08-22

## 2019-08-22 RX ORDER — HYDROCODONE BITARTRATE AND ACETAMINOPHEN 5; 325 MG/1; MG/1
1 TABLET ORAL EVERY 4 HOURS PRN
Status: CANCELLED | OUTPATIENT
Start: 2019-08-22

## 2019-08-22 RX ORDER — MUPIROCIN 20 MG/G
OINTMENT TOPICAL
Status: DISCONTINUED | OUTPATIENT
Start: 2019-08-22 | End: 2019-08-22 | Stop reason: HOSPADM

## 2019-08-22 RX ORDER — MIDAZOLAM HYDROCHLORIDE 1 MG/ML
INJECTION, SOLUTION INTRAMUSCULAR; INTRAVENOUS
Status: DISCONTINUED | OUTPATIENT
Start: 2019-08-22 | End: 2019-08-22

## 2019-08-22 RX ADMIN — DEXAMETHASONE SODIUM PHOSPHATE 4 MG: 4 INJECTION, SOLUTION INTRAMUSCULAR; INTRAVENOUS at 01:08

## 2019-08-22 RX ADMIN — ONDANSETRON 4 MG: 2 INJECTION, SOLUTION INTRAMUSCULAR; INTRAVENOUS at 01:08

## 2019-08-22 RX ADMIN — LIDOCAINE HYDROCHLORIDE 100 MG: 20 INJECTION, SOLUTION INTRAVENOUS at 01:08

## 2019-08-22 RX ADMIN — SODIUM CHLORIDE, SODIUM LACTATE, POTASSIUM CHLORIDE, AND CALCIUM CHLORIDE: .6; .31; .03; .02 INJECTION, SOLUTION INTRAVENOUS at 11:08

## 2019-08-22 RX ADMIN — OXYCODONE HYDROCHLORIDE 5 MG: 5 TABLET ORAL at 03:08

## 2019-08-22 RX ADMIN — MUPIROCIN: 20 OINTMENT TOPICAL at 11:08

## 2019-08-22 RX ADMIN — FENTANYL CITRATE 25 MCG: 0.05 INJECTION, SOLUTION INTRAMUSCULAR; INTRAVENOUS at 03:08

## 2019-08-22 RX ADMIN — MIDAZOLAM 2 MG: 1 INJECTION INTRAMUSCULAR; INTRAVENOUS at 12:08

## 2019-08-22 RX ADMIN — PROPOFOL 180 MG: 10 INJECTION, EMULSION INTRAVENOUS at 01:08

## 2019-08-22 RX ADMIN — BUPIVACAINE 20 ML: 13.3 INJECTION, SUSPENSION, LIPOSOMAL INFILTRATION at 12:08

## 2019-08-22 RX ADMIN — FENTANYL CITRATE 25 MCG: 0.05 INJECTION, SOLUTION INTRAMUSCULAR; INTRAVENOUS at 04:08

## 2019-08-22 RX ADMIN — CEFAZOLIN SODIUM 2 G: 2 SOLUTION INTRAVENOUS at 01:08

## 2019-08-22 RX ADMIN — SODIUM CHLORIDE, SODIUM LACTATE, POTASSIUM CHLORIDE, AND CALCIUM CHLORIDE: .6; .31; .03; .02 INJECTION, SOLUTION INTRAVENOUS at 01:08

## 2019-08-22 RX ADMIN — OXYCODONE HYDROCHLORIDE 5 MG: 5 TABLET ORAL at 04:08

## 2019-08-22 RX ADMIN — BUPIVACAINE HYDROCHLORIDE 10 ML: 5 INJECTION, SOLUTION EPIDURAL; INTRACAUDAL; PERINEURAL at 01:08

## 2019-08-22 RX ADMIN — LIDOCAINE HYDROCHLORIDE: 10 INJECTION, SOLUTION EPIDURAL; INFILTRATION; INTRACAUDAL; PERINEURAL at 11:08

## 2019-08-22 RX ADMIN — FENTANYL CITRATE 50 MCG: 50 INJECTION, SOLUTION INTRAMUSCULAR; INTRAVENOUS at 01:08

## 2019-08-22 RX ADMIN — FENTANYL CITRATE 100 MCG: 50 INJECTION, SOLUTION INTRAMUSCULAR; INTRAVENOUS at 12:08

## 2019-08-22 NOTE — PLAN OF CARE
"Discharged home with daughter and granddaughter with "tolerable pain", called the drugstore and medications per MD ready for . Provided with crutches, IV removed assisted to bathroom with crutches to void, both stated full understanding of all instructions and signs and symptoms to report. When daughter and granddaughter arrived the patient extended her arms and wanted to carry her. Instructed them that patient has the leg brace, but the young child could incur damage to the surgical site. Both stated understanding. Safety during ambulation with crutches and brace was reviewed, both stated readiness for discharge home  "

## 2019-08-22 NOTE — H&P
History reviewed. No pertinent past medical history.           Past Surgical History:   Procedure Laterality Date    CHOLECYSTECTOMY        CYSTOSCOPY WITH STENT REMOVAL - make first pt Left 8/21/2017     Performed by Nilsa Hitchcock MD at [facility] OR    Gastric sleeve        TUBAL LIGATION             Current Outpatient Medications   Medication Sig    acetaminophen (TYLENOL) 650 MG TbSR Take 650 mg by mouth every 8 (eight) hours.    HYDROcodone-acetaminophen (NORCO) 5-325 mg per tablet Take 1 tablet by mouth every 6 (six) hours as needed for Pain.    lisinopril (PRINIVIL,ZESTRIL) 40 MG tablet Take 40 mg by mouth once daily.    phentermine 37.5 MG capsule Take 37.5 mg by mouth every morning.    topiramate (TOPAMAX) 100 MG tablet Take 200 mg by mouth 2 (two) times daily.    venlafaxine (EFFEXOR-XR) 150 MG Cp24 Take 150 mg by mouth once daily.     ibuprofen (ADVIL,MOTRIN) 800 MG tablet Take 1 tablet (800 mg total) by mouth 3 (three) times daily.      No current facility-administered medications for this visit.          Review of patient's allergies indicates:  No Known Allergies     History reviewed. No pertinent family history.     Social History               Socioeconomic History    Marital status:        Spouse name: Not on file    Number of children: Not on file    Years of education: Not on file    Highest education level: Not on file   Occupational History    Not on file   Social Needs    Financial resource strain: Not on file    Food insecurity:       Worry: Not on file       Inability: Not on file    Transportation needs:       Medical: Not on file       Non-medical: Not on file   Tobacco Use    Smoking status: Never Smoker    Smokeless tobacco: Never Used   Substance and Sexual Activity    Alcohol use: No    Drug use: No    Sexual activity: Yes       Partners: Male   Lifestyle    Physical activity:       Days per week: Not on file       Minutes per session: Not on file  "   Stress: Not on file   Relationships    Social connections:       Talks on phone: Not on file       Gets together: Not on file       Attends [employer] service: Not on file       Active member of club or organization: Not on file       Attends meetings of clubs or organizations: Not on file       Relationship status: Not on file   Other Topics Concern    Not on file   Social History Narrative    Not on file            Chief Complaint:        Chief Complaint   Patient presents with    Knee Pain       right knee MRI results         Consulting Physician: Sd Trujillo MD     History of present illness:     This is a 44 y.o. year old female who complains of left knee pain since injury at work lifting patient 6-11-19. Felt pop as knee twisted. Sudden sharp pain. 6/10 and worse with use. Now knee gives out.     Review of Systems:     Constitution: Denies chills, fever, and sweats.  HENT: Denies headaches or blurry vision.  Cardiovascular: Denies chest pain or irregular heart beat.  Respiratory: Denies cough or shortness of breath.  Gastrointestinal: Denies abdominal pain, nausea, or vomiting.  Musculoskeletal:  Denies muscle cramps.  Neurological: Denies dizziness or focal weakness.  Psychiatric/Behavioral: Normal mental status.  Hematologic/Lymphatic: Denies bleeding problem or easy bruising/bleeding.  Skin: Denies rash or suspicious lesions.     Examination:     Vital Signs:        Vitals:     07/05/19 1013   BP: (!) 163/76   Pulse: 105   Weight: 82.1 kg (181 lb)   Height: 5' 4" (1.626 m)   PainSc:   6   PainLoc: Knee         Body mass index is 31.07 kg/m².     This a well-developed, well nourished patient in no acute distress.     Alert and oriented x 3 and cooperative to examination.        Physical Exam: Right Knee Exam     Gait                              Normal     Skin  Rash:                           None  Scars:                          None     Inspection  Erythema:                    " None  Bruising:                      None  Effusion:                      mild  Masses:                       None  Lymphadenopathy:     None     Range of Motion:        0 to 130° with pain     Medial Joint :       no  Lateral Joint :       yes     Patellofemoral Tenderness:    None  Patellofemoral Crepitus:         None     Lachman:                    1+  Anterior Drawer:          1+  Posterior Drawer:        Normal     Barbi's:                 +  Apley's:                        +     Varus Stress:              Stable  Valgus Stress:             Stable     Strength:                     4+/5     Pulses:                        Palpable distal     Sensation:                   Intact              Imaging: X-rays of right knee appears normal. The MRI study images that were interpreted personally by me today and reviewed with the patient demonstrate full thickness ACL tear and bone bruising.           Assessment: Rupture of anterior cruciate ligament of right knee, initial encounter  -     Case Request Operating Room: RECONSTRUCTION, KNEE, ACL, USING GRAFT     Right ACL tear     Acute pain of right knee           Plan:  We discussed options and she elected to proceed with ACL reconstruction using a BTB Allograft. After discussing the diagnosis and reviewing treatment options, the patient/family elected to proceed with surgical intervention.     In preparation for surgery:     Appropriate pre-operative labs were ordered.     An EKG examination was ordered.     A chest X-ray was ordered.     Pre-operative antibiotics were ordered.     The risks, benefits, and alternatives to the procedure were explained to the patient/family including, but not limited to: incomplete pain relief, surgical failure, hardware failure, need for further procedures, damage to nerves, arteries, blood vessels and other structures, infection, Deep Vein Thrombosis (DVT), Pulmonary Embolus (PE), Complex Regional Pain Syndrome  as well as general anesthetic complications including seizure, stroke, heart attack and even death. The patient/family understood these risks and wished to proceed and signed the informed consent. All questions were answered. No guarantees were implied or stated.     We will obtain the necessary clearances and schedule the patient for surgery.

## 2019-08-22 NOTE — BRIEF OP NOTE
[facility]  Brief Operative Note     SUMMARY     Surgery Date: 8/22/2019     Surgeon(s) and Role:     * Sd Trujillo MD - Primary    Assisting Surgeon: None    Pre-op Diagnosis:  Rupture of anterior cruciate ligament of right knee, initial encounter [S83.511A]    Post-op Diagnosis:  Post-Op Diagnosis Codes:     * Rupture of anterior cruciate ligament of right knee, initial encounter [S83.511A]    Procedure(s) (LRB):  RECONSTRUCTION, KNEE, ACL, ARTHROSCOPIC (Right)    Anesthesia: General    Description of the findings of the procedure: right acl arthroscopically assisted allograft reconstruction    Findings/Key Components: See Dictation     Estimated Blood Loss: * No values recorded between 8/22/2019  1:46 PM and 8/22/2019  3:24 PM *         Specimens:   Specimen (12h ago, onward)    None          Discharge Note    SUMMARY     Admit Date: 8/22/2019    Discharge Date and Time: 8/22/2019     Hospital Course : Patient Tolerated Procedure Well      Final Diagnosis: Post-Op Diagnosis Codes:     * Rupture of anterior cruciate ligament of right knee, initial encounter [S83.511A]    Disposition: Home or Self Care    Follow Up/Patient Instructions:     Medications:  Reconciled Home Medications:   Current Discharge Medication List      START taking these medications    Details   ibuprofen (ADVIL,MOTRIN) 800 MG tablet Take 1 tablet (800 mg total) by mouth 3 (three) times daily.  Qty: 60 tablet, Refills: 0      oxyCODONE-acetaminophen (PERCOCET)  mg per tablet Take 1 tablet by mouth every 6 (six) hours as needed for Pain. Quantity medically necessary  Qty: 28 tablet, Refills: 0         CONTINUE these medications which have NOT CHANGED    Details   acetaminophen (TYLENOL) 650 MG TbSR Take 650 mg by mouth every 8 (eight) hours.      lisinopril (PRINIVIL,ZESTRIL) 40 MG tablet Take 40 mg by mouth once daily.      topiramate (TOPAMAX) 100 MG tablet Take 200 mg by mouth 2 (two) times daily.      traMADol  "(ULTRAM) 50 mg tablet Take 1 tablet (50 mg total) by mouth every 6 (six) hours as needed for Pain.  Qty: 30 tablet, Refills: 0      venlafaxine (EFFEXOR-XR) 150 MG Cp24 Take 150 mg by mouth once daily.            Discharge Procedure Orders   CRUTCHES FOR HOME USE     Order Specific Question Answer Comments   Type: Axillary    Height: 5' 4" (1.626 m)    Weight: 75.8 kg (167 lb)    Length of need (1-99 months): 1      Diet general     Ice to affected area     No driving, operating heavy equipment or signing legal documents while taking pain medication     Call MD for:  temperature >100.4     Call MD for:  persistent nausea and vomiting     Call MD for:  severe uncontrolled pain     Call MD for:  difficulty breathing, headache or visual disturbances     Call MD for:  redness, tenderness, or signs of infection (pain, swelling, redness, odor or green/yellow discharge around incision site)     Call MD for:  hives     Call MD for:  persistent dizziness or light-headedness     Call MD for:  extreme fatigue     Remove dressing in 48 hours     Shower on day dressing removed (No bath)     Follow-up Information     Sd Trujillo MD In 2 weeks.    Specialties:  Sports Medicine, Orthopedic Surgery  Contact information:  [address] [place] [address]  [address]  [facility] [address] [phone]                   Dictation #1  MRN:[id number]  CSN:[id number]  [id number]  "

## 2019-08-22 NOTE — DISCHARGE INSTRUCTIONS
"Discharge Instructions: After Your Surgery/Procedure  Youve just had surgery. During surgery you were given medicine called anesthesia to keep you relaxed and free of pain. After surgery you may have some pain or nausea. This is common. Here are some tips for feeling better and getting well after surgery.     Stay on schedule with your medication.   Going home  Your doctor or nurse will show you how to take care of yourself when you go home. He or she will also answer your questions. Have an adult family member or friend drive you home.      For your safety we recommend these precaution for the first 24 hours after your procedure:  · Do not drive or use heavy equipment.  · Do not make important decisions or sign legal papers.  · Do not drink alcohol.  · Have someone stay with you, if needed. He or she can watch for problems and help keep you safe.  · Your concentration, balance, coordination, and judgement may be impaired for many hours after anesthesia.  Use caution when ambulating or standing up.     · You may feel weak and "washed out" after anesthesia and surgery.      Subtle residual effects of general anesthesia or sedation with regional / local anesthesia can last more than 24 hours.  Rest for the remainder of the day or longer if your Doctor/Surgeon has advised you to do so.  Although you may feel normal within the first 24 hours, your reflexes and mental ability may be impaired without you realizing it.  You may feel dizzy, lightheaded or sleepy for 24 hours or longer.      Be sure to go to all follow-up visits with your doctor. And rest after your surgery for as long as your doctor tells you to.  Coping with pain  If you have pain after surgery, pain medicine will help you feel better. Take it as told, before pain becomes severe. Also, ask your doctor or pharmacist about other ways to control pain. This might be with heat, ice, or relaxation. And follow any other instructions your surgeon or nurse gives " you.  Tips for taking pain medicine  To get the best relief possible, remember these points:  · Pain medicines can upset your stomach. Taking them with a little food may help.  · Most pain relievers taken by mouth need at least 20 to 30 minutes to start to work.  · Taking medicine on a schedule can help you remember to take it. Try to time your medicine so that you can take it before starting an activity. This might be before you get dressed, go for a walk, or sit down for dinner.  · Constipation is a common side effect of pain medicines. Call your doctor before taking any medicines such as laxatives or stool softeners to help ease constipation. Also ask if you should skip any foods. Drinking lots of fluids and eating foods such as fruits and vegetables that are high in fiber can also help. Remember, do not take laxatives unless your surgeon has prescribed them.  · Drinking alcohol and taking pain medicine can cause dizziness and slow your breathing. It can even be deadly. Do not drink alcohol while taking pain medicine.  · Pain medicine can make you react more slowly to things. Do not drive or run machinery while taking pain medicine.  Your health care provider may tell you to take acetaminophen to help ease your pain. Ask him or her how much you are supposed to take each day. Acetaminophen or other pain relievers may interact with your prescription medicines or other over-the-counter (OTC) drugs. Some prescription medicines have acetaminophen and other ingredients. Using both prescription and OTC acetaminophen for pain can cause you to overdose. Read the labels on your OTC medicines with care. This will help you to clearly know the list of ingredients, how much to take, and any warnings. It may also help you not take too much acetaminophen. If you have questions or do not understand the information, ask your pharmacist or health care provider to explain it to you before you take the OTC medicine.  Managing  nausea  Some people have an upset stomach after surgery. This is often because of anesthesia, pain, or pain medicine, or the stress of surgery. These tips will help you handle nausea and eat healthy foods as you get better. If you were on a special food plan before surgery, ask your doctor if you should follow it while you get better. These tips may help:  · Do not push yourself to eat. Your body will tell you when to eat and how much.  · Start off with clear liquids and soup. They are easier to digest.  · Next try semi-solid foods, such as mashed potatoes, applesauce, and gelatin, as you feel ready.  · Slowly move to solid foods. Dont eat fatty, rich, or spicy foods at first.  · Do not force yourself to have 3 large meals a day. Instead eat smaller amounts more often.  · Take pain medicines with a small amount of solid food, such as crackers or toast, to avoid nausea.     Call your surgeon if  · You still have pain an hour after taking medicine. The medicine may not be strong enough.  · You feel too sleepy, dizzy, or groggy. The medicine may be too strong.  · You have side effects like nausea, vomiting, or skin changes, such as rash, itching, or hives.       If you have obstructive sleep apnea  You were given anesthesia medicine during surgery to keep you comfortable and free of pain. After surgery, you may have more apnea spells because of this medicine and other medicines you were given. The spells may last longer than usual.   At home:  · Keep using the continuous positive airway pressure (CPAP) device when you sleep. Unless your health care provider tells you not to, use it when you sleep, day or night. CPAP is a common device used to treat obstructive sleep apnea.  · Talk with your provider before taking any pain medicine, muscle relaxants, or sedatives. Your provider will tell you about the possible dangers of taking these medicines.  © 1371-7683 [employer]. [address] [place] [address]  [place] [address]. All rights reserved. This information is not intended as a substitute for professional medical care. Always follow your healthcare professional's instructions.    General Information:    1.  Do not drink alcoholic beverages including beer for 24 hours or as long as you are on pain medication..  2.  Do not drive a motor vehicle, operate machinery or power tools, or signs legal papers for 24 hours or as long as you are on pain medication.   3.  You may experience light-headedness, dizziness, and sleepiness following surgery. Please do not stay alone. A responsible adult should be with you for this 24 hour period.  4.  Go home and rest.    5. Progress slowly to a normal diet unless instructed.  Otherwise, begin with liquids such as soft drinks, then soup and crackers working up to solid foods. Drink plenty of nonalcoholic fluids.  6.  Certain anesthetics and pain medications produce nausea and vomiting in certain       individuals. If nausea becomes a problem at home, call you doctor.    7. A nurse will be calling you sometime after surgery. Do not be alarmed. This is our way of finding out how you are doing.    8. Several times every hour while you are awake, take 2-3 deep breaths and cough. If you had stomach surgery hold a pillow or rolled towel firmly against your stomach before you cough. This will help with any pain the cough might cause.  9. Several times every hour while you are awake, pump and flex your feet 5-6 times and do foot circles. This will help prevent blood clots.    10.Call your doctor for severe pain, bleeding, fever, or signs or symptoms of infection (pain, swelling, redness, foul odor, drainage).      Post op instructions for prevention of DVT  What is deep vein thrombosis?  Deep vein thrombosis (DVT) is the medical term for blood clots in the deep veins of the leg.  These blood clots can be dangerous.  A DVT can block a blood vessel and keep blood from getting where it needs to go.   Another problem is that the clot can travel to other parts of the body such as the lungs.  A clot that travels to the lungs is called a pulmonary embolus (PE) and can cause serious problems with breathing which can lead to death.  Am I at risk for DVT/PE?  If you are not very active, you are at risk of DVT.  Anyone confined to bed, sitting for long periods of time, recovering from surgery, etc. increases the risk of DVT.  Other risk factors are cancer diagnosis, certain medications, estrogen replacement in any form,older age, obesity, pregnancy, smoking, history of clotting disorders, and dehydration.  How will I know if I have a DVT?   Swelling in the lower leg   Pain   Warmth, redness, hardness or bulging of the vein  If you have any of these symptoms, call your doctors office right away.  Some people will not have any symptoms until the clot moves to the lungs.  What are the symptoms of a PE?   Panting, shortness of breath, or trouble breathing   Sharp, knife-like chest pain when you breathe   Coughing or coughing up blood   Rapid heartbeat  If you have any of these symptoms or get worse quickly, call 911 for emergency treatment.  How can I prevent a DVT?   Avoid long periods of inactivity and dont cross your legs--get up and walk around every hour or so.   Stay active--walking after surgery is highly encouraged.  This means you should get out of the house and walk in the neighborhood.  Going up and down stairs will not impair healing (unless advised against such activity by your doctor).     Drink plenty of noncaffeinated, nonalcoholic fluids each day to prevent dehydration.   Wear special support stockings, if they have been advised by your doctor.   If you travel, stop at least once an hour and walk around.   Avoid smoking (assistance with stopping is available through your healthcare provider)  Always notify your doctor if you are not able to follow the post operative instructions that are  given to you at the time of discharge.  It may be necessary to prescribe one of the medications available to prevent DVT.Using Opioids for Pain Management     Your doctor has given instructions for you to take an opioid.  This is a drug for bad pain.  It helps control pain without causing bleeding and kidney problems.  Common opioid names are morphine, hydromorphone, oxycodone, and methadone. These drugs are called narcotics.    There are several safety concerns you need to know.     · It is against the law to give or sell this drug to another person.  You must keep this medicine safely locked.    · You may have side effects from taking this medication.  These include nausea, itching, sweating, sleepiness, a change in your ability to breathe, and depression.  · Do not take alcohol or sleeping pills opioids.    · Long-term opoid use may no longer giver you relief from pain.  It can cause you stomach pain, mental anxiety, and headaches.  Long-term opoid use can potentially lead to unlawful street drug abuse and reduce your ability to stay employed.    · Your body may become opioid tolerant if you need to take more to get relief.    · You must stop taking opioids if you begin having more pain as a result of the medicine.    · Opioid withdrawal occurs when you have to stop taking the drug.  It can cause you to have nausea, vomiting, diarrhea, stomach pain, anxiety, and dilated pupils in your eyes. This condition means you are opioid dependent.    · Addiction is a drug induced brain disease. It means there are changes in how your brain is working.  Children, teens, and young adults under 25 years old are more likely to get addicted to opioids.      · Addiction can happen with repeated opioid use.  It does not happen with short-term use of two weeks or less.        We hope your stay was comfortable as you heal now, mend and rest.    For we have enjoyed taking care of you by giving your our best.    And as you get better,  by regaining your health and strength;   We count it as a privilege to have served you and hope your time at [facility] was well spent.      Thank  You!!!   For more information, please speak with your doctor or pharmacist.

## 2019-08-22 NOTE — PLAN OF CARE
Pt transferred to phase II. VSS, pain meds given, dressing to R knee cdi, R knee brace on, cryo on for home use, tolerated clear liquids without N/V. Spoke with Dr. Rivera and ok to send pt to post op. Report given to ROSALIO Zamora.

## 2019-08-22 NOTE — ANESTHESIA PROCEDURE NOTES
Peripheral Block    Patient location during procedure: pre-op   Block not for primary anesthetic.  Reason for block: at surgeon's request and post-op pain management   Post-op Pain Location: right knee   Start time: 8/22/2019 12:00 PM  Timeout: 8/22/2019 12:00 PM   End time: 8/22/2019 12:06 PM    Staffing  Authorizing Provider: Jose Rivera MD  Performing Provider: Jose Rivera MD    Preanesthetic Checklist  Completed: patient identified, site marked, surgical consent, pre-op evaluation, timeout performed, IV checked, risks and benefits discussed and monitors and equipment checked  Peripheral Block  Patient position: supine  Prep: ChloraPrep  Patient monitoring: heart rate, cardiac monitor, continuous pulse ox, continuous capnometry and frequent blood pressure checks  Block type: adductor canal  Laterality: right  Injection technique: single shot  Needle  Needle type: Stimuplex   Needle gauge: 21 G  Needle length: 4 in  Needle localization: anatomical landmarks and ultrasound guidance   -ultrasound image captured on disc.  Assessment  Injection assessment: negative aspiration, negative parasthesia and local visualized surrounding nerve  Paresthesia pain: none  Heart rate change: no  Slow fractionated injection: yes  Additional Notes  VSS. +Exparel 1.3% 20cc. DOSC RN monitoring vitals throughout procedure.  Patient tolerated procedure well.

## 2019-08-22 NOTE — ANESTHESIA POSTPROCEDURE EVALUATION
Anesthesia Post Evaluation    Patient: Nilsa York    Procedure(s) Performed: Procedure(s) (LRB):  RECONSTRUCTION, KNEE, ACL, ARTHROSCOPIC (Right)    Final Anesthesia Type: general  Patient location during evaluation: PACU  Patient participation: Yes- Able to Participate  Level of consciousness: awake and alert  Post-procedure vital signs: reviewed and stable  Pain management: adequate  Airway patency: patent  PONV status at discharge: No PONV  Anesthetic complications: no      Cardiovascular status: hemodynamically stable  Respiratory status: unassisted and room air  Hydration status: euvolemic  Follow-up not needed.          Vitals Value Taken Time   /82 8/22/2019  4:30 PM   Temp 37.2 °C (99 °F) 8/22/2019  4:25 PM   Pulse 102 8/22/2019  5:00 PM   Resp 20 8/22/2019  5:00 PM   SpO2 97 % 8/22/2019  5:00 PM         Event Time     Out of Recovery 16:30:00          Pain/Apolinar Score: Pain Rating Prior to Med Admin: 8 (8/22/2019  4:47 PM)  Pain Rating Post Med Admin: 5 (8/22/2019  4:25 PM)  Apolinar Score: 10 (8/22/2019  5:40 PM)

## 2019-08-22 NOTE — TRANSFER OF CARE
"Anesthesia Transfer of Care Note    Patient: Nilsa York    Procedure(s) Performed: Procedure(s) (LRB):  RECONSTRUCTION, KNEE, ACL, ARTHROSCOPIC (Right)    Patient location: PACU    Anesthesia Type: general    Transport from OR: Transported from OR on 2-3 L/min O2 by NC with adequate spontaneous ventilation    Post pain: adequate analgesia    Post assessment: no apparent anesthetic complications and tolerated procedure well    Post vital signs: stable    Level of consciousness: awake, alert and oriented    Nausea/Vomiting: no nausea/vomiting    Complications: none    Transfer of care protocol was followed      Last vitals:   Visit Vitals  BP (!) 150/95 (BP Location: Left arm, Patient Position: Lying)   Pulse 84   Temp 36.9 °C (98.4 °F) (Skin)   Resp 18   Ht 5' 4" (1.626 m)   Wt 75.8 kg (167 lb)   LMP 08/13/2019   SpO2 100%   Breastfeeding? No   BMI 28.67 kg/m²     "

## 2019-08-22 NOTE — ANESTHESIA PREPROCEDURE EVALUATION
08/22/2019  Nilsa York is a 44 y.o., female.    Anesthesia Evaluation    I have reviewed the Patient Summary Reports.    I have reviewed the Nursing Notes.   I have reviewed the Medications.     Review of Systems  Anesthesia Hx:  No problems with previous Anesthesia    Social:  Non-Smoker    Hematology/Oncology:  Hematology Normal   Oncology Normal     EENT/Dental:EENT/Dental Normal   Cardiovascular:   Hypertension    Pulmonary:  Pulmonary Normal    Renal/:   Chronic Renal Disease    Hepatic/GI:  Hepatic/GI Normal    Musculoskeletal:   ACL tear of right leg   Neurological:  Neurology Normal        Physical Exam  General:  Well nourished    Airway/Jaw/Neck:  Airway Findings: Mouth Opening: Normal Tongue: Normal  General Airway Assessment: Adult  Mallampati: I  TM Distance: Normal, at least 6 cm        Eyes/Ears/Nose:  EYES/EARS/NOSE FINDINGS: Normal   Dental:  DENTAL FINDINGS: Normal   Chest/Lungs:  Chest/Lungs Findings: Clear to auscultation, Normal Respiratory Rate     Heart/Vascular:  Heart Findings: Rate: Normal  Rhythm: Regular Rhythm        Mental Status:  Mental Status Findings:  Cooperative, Alert and Oriented         Anesthesia Plan  Type of Anesthesia, risks & benefits discussed:  Anesthesia Type:  general  Patient's Preference:   Intra-op Monitoring Plan: standard ASA monitors  Intra-op Monitoring Plan Comments:   Post Op Pain Control Plan: IV/PO Opioids PRN, multimodal analgesia and peripheral nerve block  Post Op Pain Control Plan Comments:   Induction:   IV  Beta Blocker:  Patient is not currently on a Beta-Blocker (No further documentation required).       Informed Consent: Patient understands risks and agrees with Anesthesia plan.  Questions answered. Anesthesia consent signed with patient.  ASA Score: 2     Day of Surgery Review of History & Physical: I have interviewed and examined  the patient. I have reviewed the patient's H&P dated:  There are no significant changes.  H&P update referred to the surgeon.         Ready For Surgery From Anesthesia Perspective.

## 2019-08-22 NOTE — INTERVAL H&P NOTE
The patient has been examined and the H&P has been reviewed:    I concur with the findings and no changes have occurred since H&P was written.    Anesthesia/Surgery risks, benefits and alternative options discussed and understood by patient/family.          Active Hospital Problems    Diagnosis  POA    Right ACL tear [S82.405A]  Yes      Resolved Hospital Problems   No resolved problems to display.

## 2019-08-23 NOTE — OP NOTE
DATE OF PROCEDURE:  08/22/2019    PREOPERATIVE DIAGNOSIS:  Right ACL tear.    POSTOPERATIVE DIAGNOSES:  1.  Right ACL tear.  2.  Right knee chondral defects.    PROCEDURE PERFORMED:  Right ACL arthroscopically assisted allograft   reconstruction.    SURGEON:  Sd Trujillo M.D.    ASSISTANT:  Josefina Shoemaker.    ANESTHESIA:  General with a local block.    HISTORY:  Ms. York is a 44-year-old woman who presented to our office with a   complaint of knee pain and instability following an injury at work.  Her imaging   studies and examination were consistent with an ACL tear.  We discussed   treatment options with her and she elected to proceed with an allograft   reconstruction of her ACL.  The risks and benefits of surgical intervention   including the potential for incomplete pain relief and the need for further   procedures were explained to the patient who expressed she understood and wished   to proceed.  Informed consent was obtained.    PROCEDURE IN DETAIL:  After verifying informed consent and correct site, the   patient was brought back to the Operating Room and placed on the OR table in the   supine position.  Preoperative IV antibiotics were administered and a timeout   was performed.  Right lower extremity was then prepped and draped in sterile   fashion.  We began by creating a lateral portal.  We then visualized the knee.    The patella had some areas of grade II fissuring in the center portion.  The   trochlea appeared to be in good condition.  The lateral gutter was clear.  There   was a medial plica band.  Proceeding into the medial compartment, we created a   medial portal and probed the meniscus, which was stable.  The medial tibial   plateau was normal.  There was a grade II to III area of cartilage damage on the   medial femoral condyle that was approximately 1 cm in width and approximately 2   to 2.5 cm from the notch medially across the knee.  The cartilage surrounding   this was also soft to  palpation.  Because this did not have constrained walls,   we elected to leave this alone and not perform any sort of cartilage procedure   on it.  We then proceeded into the notch where the stump of the ACL was located.    We then proceeded into the lateral compartment where we probed the lateral   meniscus and found to be normal.  The cartilage of the lateral femoral condyle   and lateral tibial plateau were also normal.  At this point, we turned our   attention back to the ACL where we debrided down the remnants of the ACL, both   off of the femur and the tibia.  Once we had cleared this out, we identified the   over-the-top position on the posterior aspect of the lateral femoral condyle.    We then turned our attention to the graft, which was an allograft, bone tendon   bone, which had been appropriately defrosted on the back table.  The graft had   an overall length of 100 and had two bone blocks of 25 x 10 mm segments.  We did   some minor clean up to the edges of the tissue and then inserted our FiberWire   sutures through the pull holes.  With the graft prepared, we then turned our   attention back to the knee.  We inserted our tibial guide set at 57 degrees and   drilled our tibial tunnel.  Once the tibial tunnel was completed, we then   hyperflexed the knee and placed our over the top guide to drill our femoral   tunnel.  The femoral tunnel was drilled to a depth of just over 25 mm.  Once our   tunnels were complete, we then passed our passing sutures through the tunnels.    We then took the sutures from our graft and pulled them through the knee with   the passing sutures.  Once the graft sutures were through the knee, we then   pulled the graft up and into the knee.  We lavaged the proximal tibial graft   into the femoral tunnel.  We then placed a nitinol wire and created a notch.  We   then inserted a Mitek Maame 8 x 23 mm interference screw.  With our femoral   fixation complete, we turned our  attention to the tibia.  Once again, a nitinol   wire was placed along with a notch of the tibial tunnel.  The graft was   appropriately tensioned and then a Mitek 9 x 23 mm Ellie screw was inserted   into the tibial tunnel to give us tibial fixation.  Once our screws were in   place, we tested anterior drawer and Lachman and found them to be normal.  We   then took our final pictures of the ACL graft and removed the Prolene sutures.    We then closed the skin wounds using Monocryl.  A sterile dressing was applied   along with a hinged knee brace locked in extension.  The patient was then awoken   from anesthesia, extubated, and brought to the PACU in good condition.    TOTAL TOURNIQUET TIME:  None.    DRAINS:  None.    SPECIMENS:  None.    COMPLICATIONS:  None.    ESTIMATED BLOOD LOSS:  Minimal.    POSTOPERATIVE PLAN:  The patient will be discharged home this afternoon and   follow up with us in clinic in two weeks' time.  She was given complete postop   instructions and pain medication.      ZAN  dd: 08/22/2019 15:47:31 (CDT)  td: 08/22/2019 21:33:03 (NATE)  Doc ID   #[id number]  Job ID #[id number]    CC:

## 2019-09-05 DIAGNOSIS — M25.561 RIGHT KNEE PAIN, UNSPECIFIED CHRONICITY: Primary | ICD-10-CM

## 2019-09-09 ENCOUNTER — HOSPITAL ENCOUNTER (OUTPATIENT)
Dept: RADIOLOGY | Facility: HOSPITAL | Age: 44
Discharge: HOME | End: 2019-09-09
Attending: ORTHOPAEDIC SURGERY
Payer: OTHER MISCELLANEOUS

## 2019-09-09 ENCOUNTER — OFFICE VISIT (OUTPATIENT)
Dept: ORTHOPEDICS | Facility: CLINIC | Age: 44
End: 2019-09-09
Payer: OTHER MISCELLANEOUS

## 2019-09-09 VITALS
SYSTOLIC BLOOD PRESSURE: 454 MMHG | HEART RATE: 83 BPM | HEIGHT: 64 IN | DIASTOLIC BLOOD PRESSURE: 95 MMHG | WEIGHT: 167 LBS | BODY MASS INDEX: 28.51 KG/M2

## 2019-09-09 DIAGNOSIS — T84.89XA: Primary | ICD-10-CM

## 2019-09-09 DIAGNOSIS — S83.511D RUPTURE OF ANTERIOR CRUCIATE LIGAMENT OF RIGHT KNEE, SUBSEQUENT ENCOUNTER: Primary | ICD-10-CM

## 2019-09-09 DIAGNOSIS — M25.561 RIGHT KNEE PAIN, UNSPECIFIED CHRONICITY: ICD-10-CM

## 2019-09-09 DIAGNOSIS — S83.511A RUPTURE OF ANTERIOR CRUCIATE LIGAMENT OF RIGHT KNEE, INITIAL ENCOUNTER: Primary | ICD-10-CM

## 2019-09-09 PROCEDURE — 99999 PR PBB SHADOW E&M-EST. PATIENT-LVL III: ICD-10-PCS | Mod: PBBFAC,,, | Performed by: ORTHOPAEDIC SURGERY

## 2019-09-09 PROCEDURE — 99999 PR PBB SHADOW E&M-EST. PATIENT-LVL III: CPT | Mod: PBBFAC,,, | Performed by: ORTHOPAEDIC SURGERY

## 2019-09-09 PROCEDURE — 99024 PR POST-OP FOLLOW-UP VISIT: ICD-10-PCS | Mod: S$GLB,,, | Performed by: ORTHOPAEDIC SURGERY

## 2019-09-09 PROCEDURE — 73560 X-RAY EXAM OF KNEE 1 OR 2: CPT | Mod: TC,PN,RT

## 2019-09-09 PROCEDURE — 73560 X-RAY EXAM OF KNEE 1 OR 2: CPT | Mod: 26,RT,, | Performed by: RADIOLOGY

## 2019-09-09 PROCEDURE — 99024 POSTOP FOLLOW-UP VISIT: CPT | Mod: S$GLB,,, | Performed by: ORTHOPAEDIC SURGERY

## 2019-09-09 PROCEDURE — 73560 XR KNEE 1 OR 2 VIEW RIGHT: ICD-10-PCS | Mod: 26,RT,, | Performed by: RADIOLOGY

## 2019-09-11 NOTE — PROGRESS NOTES
Past Medical History:   Diagnosis Date    Encounter for blood transfusion     Hypertension        Past Surgical History:   Procedure Laterality Date    CHOLECYSTECTOMY      CYSTOSCOPY WITH STENT REMOVAL - make first pt Left 8/21/2017    [facility]    Gastric sleeve      RECONSTRUCTION, KNEE, ACL, ARTHROSCOPIC Right 8/22/2019    [facility]d [facility]illo [facility]    TUBAL LIGATION         Current Outpatient Medications   Medication Sig    acetaminophen (TYLENOL) 650 MG TbSR Take 650 mg by mouth every 8 (eight) hours.    ibuprofen (ADVIL,MOTRIN) 800 MG tablet Take 1 tablet (800 mg total) by mouth 3 (three) times daily.    lisinopril (PRINIVIL,ZESTRIL) 40 MG tablet Take 40 mg by mouth once daily.    oxyCODONE-acetaminophen (PERCOCET)  mg per tablet Take 1 tablet by mouth every 6 (six) hours as needed for Pain. Quantity medically necessary    topiramate (TOPAMAX) 100 MG tablet Take 200 mg by mouth 2 (two) times daily.    traMADol (ULTRAM) 50 mg tablet Take 1 tablet (50 mg total) by mouth every 6 (six) hours as needed for Pain.    venlafaxine (EFFEXOR-XR) 150 MG Cp24 Take 150 mg by mouth once daily.      No current facility-administered medications for this visit.      Facility-Administered Medications Ordered in Other Visits   Medication    lactated ringers infusion    sodium chloride 0.9% flush 3 mL       Review of patient's allergies indicates:  No Known Allergies    History reviewed. No pertinent family history.    Social History     Socioeconomic History    Marital status:      Spouse name: Not on file    Number of children: Not on file    Years of education: Not on file    Highest education level: Not on file   Occupational History    Not on file   Social Needs    Financial resource strain: Not on file    Food insecurity:     Worry: Not on file     Inability: Not on file    Transportation needs:     Medical: Not on file      Non-medical: Not on file   Tobacco Use    Smoking status: Never Smoker    Smokeless tobacco: Never Used   Substance and Sexual Activity    Alcohol use: No    Drug use: No    Sexual activity: Yes     Partners: Male   Lifestyle    Physical activity:     Days per week: Not on file     Minutes per session: Not on file    Stress: Not on file   Relationships    Social connections:     Talks on phone: Not on file     Gets together: Not on file     Attends Hoahaoism service: Not on file     Active member of club or organization: Not on file     Attends meetings of clubs or organizations: Not on file     Relationship status: Not on file   Other Topics Concern    Not on file   Social History Narrative    Not on file       Chief Complaint:   Chief Complaint   Patient presents with    Post-op Evaluation     S/P RIGHT KNEE ACL RECONSTRUCTION 8/22/19       Consulting Physician: No ref. provider found    History of present illness: This is a 44 y.o. female following up for right ACL reconstruction on 08/22/2019.      Review of Systems:    Constitution: Denies chills, fever, and sweats.    HENT: Denies headaches or blurry vision.    Cardiovascular: Denies chest pain or irregular heart beat.    Respiratory: Denies cough or shortness of breath.    Gastrointestinal: Denies abdominal pain, nausea, or vomiting.    Musculoskeletal:  Denies muscle cramps.    Neurological: Denies dizziness or focal weakness.    Psychiatric/Behavioral: Normal mental status.    Hematologic/Lymphatic: Denies bleeding problem or easy bruising/bleeding.    Skin: Denies rash or suspicious lesions.      Examination:    Vital Signs:    Vitals:    09/09/19 1031   BP: (!) 454/95   Pulse: 83       Body mass index is 28.67 kg/m².    This a well-developed, well nourished patient in no acute distress.    Alert and oriented and cooperative to examination.       Physical Exam:  Right knee    Inspection/Observation   Swelling:   mild  Erythema:   none  Bruising:    none  Wounds:   Clean and dry  Drainage:  None    Range of Motion   0-90    Muscle Strength   4-4+    Other   Sensation:  normal  Pulse:    palpable    Imaging: Normal post-operative XR     Assessment: Rupture of anterior cruciate ligament of right knee, subsequent encounter        Plan:  She is doing well.  Her pain is a 6/10 and worse at night.  She fell 3 days ago and the knee is a little sore from that.  She is not wearing her brace.  We will start therapy here.  Back in 4 weeks.      DISCLAIMER: This note may have been dictated using voice recognition software and may contain grammatical errors. Report sent to referring provider as required.

## 2019-10-04 ENCOUNTER — CLINICAL SUPPORT (OUTPATIENT)
Dept: REHABILITATION | Facility: HOSPITAL | Age: 44
End: 2019-10-04
Payer: OTHER MISCELLANEOUS

## 2019-10-04 DIAGNOSIS — S83.511D RUPTURE OF ANTERIOR CRUCIATE LIGAMENT OF RIGHT KNEE, SUBSEQUENT ENCOUNTER: ICD-10-CM

## 2019-10-04 DIAGNOSIS — R53.1 DECREASED STRENGTH: ICD-10-CM

## 2019-10-04 DIAGNOSIS — M25.60 LIMITED JOINT RANGE OF MOTION (ROM): ICD-10-CM

## 2019-10-04 DIAGNOSIS — R26.9 GAIT DISTURBANCE: Primary | ICD-10-CM

## 2019-10-04 PROCEDURE — 97110 THERAPEUTIC EXERCISES: CPT | Mod: PN

## 2019-10-04 PROCEDURE — 97161 PT EVAL LOW COMPLEX 20 MIN: CPT | Mod: PN

## 2019-10-04 NOTE — PATIENT INSTRUCTIONS
Gastroc, Sitting (Passive)        Sit with strap or towel around ball of foot. Gently pull toward body. Hold 30 seconds.   Repeat 3 times per session. Do 2 sessions per day.    Copyright © [employer]. All rights reserved.   Sitting: Unilateral        Sit, one leg straight, other bent with foot tucked into inner thigh. Reach hand toward toes. Keep back straight. Hold 30 seconds.  Repeat 3 times per session. Do 2 sessions per day.    Copyright © [employer]. All rights reserved.   Strengthening: Quadriceps Set        Tighten muscles on top of thighs by pushing knees down into surface. Hold 5 seconds.  Repeat 10 times per set. Do 3 sets per session. Do 2 sessions per day.     https://[employer]/602     Copyright © [employer]. All rights reserved.   Strengthening: Straight Leg Raise (Phase 2)        Resting on forearms, tighten muscles on front of left thigh, then lift leg 18 inches from surface, keeping knee locked.  Repeat 10 times per set. Do 3. sets per session. Do 2 sessions per day.     https://[employer]/616     Copyright © [employer]. All rights reserved.

## 2019-10-04 NOTE — PLAN OF CARE
"  TIME RECORD    Date: 10/04/2019    Start Time:  1105  Stop Time:  1200    PROCEDURES:    TIMED  Procedure Time Min.     Therex Start:  1150  Stop:  1200   10    Start:  Stop:     Start:  Stop:     Start:  Stop:          UNTIMED  Procedure Time Min.     Initial evaluation Start:  1105  Stop:  1147   40    Start:  Stop:      Total Timed Minutes:  10  Total Timed Units:  1  Total Untimed Units:  1  Charges Billed/# of units:  1    OUTPATIENT PHYSICAL THERAPY   PATIENT EVALUATION    Onset Date: 6/11/2019 with surgery 8/22/2019  Primary Diagnosis:   1. Gait disturbance     2. Limited joint range of motion (ROM)     3. Decreased strength       Treatment Diagnosis: Gait disturbance s/p ACL reconstruction, ROM deficit, strength deficit  Past Medical History:   Diagnosis Date    Encounter for blood transfusion     Hypertension      Precautions: ACL protocol  Prior Therapy: None   Medications: Nilsa York has a current medication list which includes the following prescription(s): acetaminophen, ibuprofen, lisinopril, oxycodone-acetaminophen, topiramate, tramadol, and venlafaxine, and the following Facility-Administered Medications: lactated ringers and sodium chloride 0.9%.  Nutrition:  Overweight  History of Present Illness: Pt presents to PT with history of R knee injury that occurred on 6/11/2019 when she was transferring a patient to wheelchair.  She states that as she was pivoting the patient, her foot "got stuck" and she twisted her knee.  She felt a pop and immediate onset pain of R knee, resulting in a fall.  She also had immediate onset swelling.  She went to Urgent Care. Was placed in brace and referred to ortho.  He sent for MRI that revealed torn ACL and "a bunch of other damage in there."  MRI report: Impression "  1. Full-thickness tear of the ACL midsubstance. 2. Bone contusion posterolateral tibia. 3. Possible undersurface tear of the posterior horn lateral meniscus, seen in only 1 plane. 4. " "Patellofemoral degenerative change including focal area of grade 4 chondrosis. She underwent arthroscopic ACL repair on 8/22/2019 without complication.  Has been recuperating at home since that time.  She had follow up with MD 9/9/2019 and is now referred to PT.  Presents to PT this date ambulating without device but wearing neoprene knee sleeve (adjustable but not locking).  She reports that she has a locking knee brace at home that she uses at home but not when she is in public.    Prior Level of Function: Independent   No limitations.  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  Typically participates in fishing, hunting.  Mother of 6 with 2 small children at home.  Describes herself as "very active".  Also participates in boating activities.  She is primary provider for the household and is responsible for cooking and cleaning.   Social History: Lives with 13 y/o, 8 y/o, and 9 y/o.  Place of Residence (Steps/Adaptations): 1 [place] home with no steps to enter.   Functional Deficits Leading to Referral/Nature of Injury: Unable to work, limited walking, unable to squat, kneel, is able to perform light housework and cooking but has not been able to return to full activity. Unable to participate in recreational activities.  Difficulty with tub transfers.  Interferes with sexual activity  Patient Therapy Goals: To get my knee back to where I can get back to work so I can support my family."    Subjective     Nilsa York states "I can't do much".    Pain:  Location: Medial and lateral aspect of R patella  Description: Intermittent aching and throbbing.  Some numbness  Activities Which Increase Pain: bending for LE dressing, prolonged standing, prolonged walking, sitting with pressure on R hip, laying flat in bed    Activities Which Decrease Pain: Tylenol Arthritis, sidelying with pillow between knees, ibuprofen.    Pain Scale: 2/10 at best 4/10 now  8/10 at worst    Objective     Posture: Standing:  Wearing neoprene " knee sleeve with medial/lateral supports.  Weight bearing is equal.  Sitting:  Keeps knee partially extended.    Palpation: Tenderness present over portal incisions.  Incisions are well healing  Sensation: Reports numbness around patella (medial and lateral aspect)  DTRs:  N/A  Range of Motion/Strength: B hips and ankles WNL, L knee WNL  R knee AROM 0* - 103*  R knee PROM 0* - 110* with complaints of discomfort.  Strength:  Right   Left   Hip flex   4+/5  4+/5   Ext   4+/5  4+/5   Abd/add 4+/5  4+/5   Knee flex  N/T  5/5   Knee ext  N/T  5/5   Ankle DF 5/5  5/5     Flexibility: Hamstring length R 130*, L 140*; calf minimal tightness B.    Gait: Without AD  Analysis: Decreased vinicio, minimally decreased R knee flexion for swing through   Bed Mobility:Independent  Transfers: Independent  Increased UE support  Special Tests: Patellar mobility WNL, Swelling: minimal swelling present R knee.    Other: FOTO Knee survey indicates a 54% disability   Treatment: Evaluation completed.  Educated pt in role of PT and proposed POC.  Verbalized understanding and agreement.  Initiated instruction in HEP:  Quad sets, SLR, long sitting hamstring stretch, and long sitting calf stretch using strap.  Illustrated instructions issued.      Assessment       Initial Assessment (Pertinent finding, problem list and factors affecting outcome): Pt presents to PT with history of recent ACL rupture with subsequent reconstruction.  Problems include intermittent R knee pain, increased tenderness of portal scars, decreased R knee ROM, decreased strength, impaired functional mobility and impaired gait.  She should benefit from skilled PT services to address these limitations.    Rehab Potiential: good    Short Term Goals (3 Weeks): 1) Decrease max pain to < 5/10, 2) Improve AROM to 0*-130*, 3) Pt will consistently demonstrate adequate knee flexion for swing through, 4) Pt will safely navigate 6 steps utilizing reciprocal pattern  Long Term Goals (6  Weeks): 1) Pt will demonstrate normalized vinicio for walking, 2) Pt will demonstrate appropriate technique for mid squat to allow safe lifting mechanics, 3) Pt will improve standing/walking tolerance to > 2 hours for performance of work related activities, 4) Improve FOTO disability rating to < 25%, 5) Pt will be independent in HEP.      Plan     Certification Period: 10/4/2019 to 11/15/2019  Recommended Treatment Plan: 3 times per week for 6 weeks: Electrical Stimulation NMES as needed, Gait Training, Manual Therapy, Moist Heat/ Ice, Patient Education, Therapeutic Activites, Therapeutic Exercise and Therapeutic taping as needed  Other Recommendations: N/A      Therapist: Ebony Barone, PT    I CERTIFY THE NEED FOR THESE SERVICES FURNISHED UNDER THIS PLAN OF TREATMENT AND WHILE UNDER MY CARE    Physician's comments: ________________________________________________________________________________________________________________________________________________      Physician's Name: ___________________________________

## 2019-10-07 ENCOUNTER — CLINICAL SUPPORT (OUTPATIENT)
Dept: REHABILITATION | Facility: HOSPITAL | Age: 44
End: 2019-10-07
Attending: ORTHOPAEDIC SURGERY
Payer: OTHER MISCELLANEOUS

## 2019-10-07 DIAGNOSIS — M25.60 LIMITED JOINT RANGE OF MOTION (ROM): ICD-10-CM

## 2019-10-07 DIAGNOSIS — R26.9 GAIT DISTURBANCE: ICD-10-CM

## 2019-10-07 DIAGNOSIS — R53.1 DECREASED STRENGTH: ICD-10-CM

## 2019-10-07 PROCEDURE — 97010 HOT OR COLD PACKS THERAPY: CPT | Mod: PN

## 2019-10-07 PROCEDURE — 97110 THERAPEUTIC EXERCISES: CPT | Mod: PN

## 2019-10-07 PROCEDURE — 97032 APPL MODALITY 1+ESTIM EA 15: CPT | Mod: PN

## 2019-10-07 NOTE — PROGRESS NOTES
"Name: Nilsa York  Clinic Number: [id number]  Date of Treatment: 10/07/2019   Diagnosis:   Encounter Diagnoses   Name Primary?    Gait disturbance     Limited joint range of motion (ROM)     Decreased strength        Time in: 1140  Time Out: 1225  Total Treatment Time: 45  Group Time: 0      Subjective:    Nilsa reports increased pain 2nd to increased activities yesterday.  Patient reports their pain to be 7-8/10 on a 0-10 scale with 0 being no pain and 10 being the worst pain imaginable.    Objective    Nilsa received therapeutic exercises to R LE to develop strength, endurance and flexibility for 30 minutes including:     Bike Lv3 10'  Hamstring stretch 3x30" long sit R  Gastroc stretch 3x30" 1/2 roll B  HR/TR x30    The patient received the following supervised modalities after being cleared for contradictions: IFC Electrical Stimulation:  Nilsa received IFC Electrical Stimulation for pain control applied to the R Knee along with cold pack Pt received stimulation at a frequency of 12.0 mA for 15 minutes. Nilsa tolderated treatment well without any adverse effects.      Pt demo good understanding of the education provided. Nilsa demonstrated good return demonstration of activities. Patient requesting decreased activities 2nd to increased pain.    Assessment:     Pt will continue to benefit from skilled PT intervention. Medical Necessity is demonstrated by:  Pain limits function of effected part for some activities, Unable to participate fully in daily activities, Requires skilled supervision to complete and progress HEP and Weakness.    Plan:    Continue with established Plan of Care towards PT goals.   "

## 2019-10-09 ENCOUNTER — CLINICAL SUPPORT (OUTPATIENT)
Dept: REHABILITATION | Facility: HOSPITAL | Age: 44
End: 2019-10-09
Attending: ORTHOPAEDIC SURGERY
Payer: OTHER MISCELLANEOUS

## 2019-10-09 DIAGNOSIS — M25.60 LIMITED JOINT RANGE OF MOTION (ROM): ICD-10-CM

## 2019-10-09 DIAGNOSIS — R53.1 DECREASED STRENGTH: ICD-10-CM

## 2019-10-09 DIAGNOSIS — R26.9 GAIT DISTURBANCE: ICD-10-CM

## 2019-10-09 PROCEDURE — 97110 THERAPEUTIC EXERCISES: CPT | Mod: PN

## 2019-10-09 PROCEDURE — 97032 APPL MODALITY 1+ESTIM EA 15: CPT | Mod: PN

## 2019-10-09 PROCEDURE — 97010 HOT OR COLD PACKS THERAPY: CPT | Mod: PN

## 2019-10-09 NOTE — PROGRESS NOTES
"Name: Nilsa York  Clinic Number: [id number]  Date of Treatment: 10/09/2019   Diagnosis:   Encounter Diagnoses   Name Primary?    Gait disturbance     Limited joint range of motion (ROM)     Decreased strength        Time in: 1355  Time Out: 1455  Total Treatment Time: 60  Group Time: 0      Subjective:    Nilsa reports decreased pain "some" in R knee.  Patient reports their pain to be 6/10 on a 0-10 scale with 0 being no pain and 10 being the worst pain imaginable.    Objective    Nilsa received therapeutic exercises to develop strength, endurance and flexibility for 45 minutes including:     Bike Lv3 10'  Hamstring stretch 3x30" long sit R/L  Gastroc stretch 3x30" 1/2 roll B  // bar x30: HR and mini squats  Hip 4-way 3x10 R    The patient received the following supervised modalities after being cleared for contradictions: IFC Electrical Stimulation:  Nilsa received IFC Electrical Stimulation for pain control applied to the R Knee along with cold pack with extra towel.  Pt received stimulation at a frequency of 12.0 mA for 15 minutes. Nilsa tolderated treatment well without any adverse effects.       Pt demo good understanding of the education provided. Nilsa demonstrated good return demonstration of activities.     Assessment:     Pt will continue to benefit from skilled PT intervention. Medical Necessity is demonstrated by:  Pain limits function of effected part for some activities, Unable to participate fully in daily activities, Requires skilled supervision to complete and progress HEP and Weakness.    Plan:    Continue with established Plan of Care towards PT goals.   "

## 2019-10-11 ENCOUNTER — CLINICAL SUPPORT (OUTPATIENT)
Dept: REHABILITATION | Facility: HOSPITAL | Age: 44
End: 2019-10-11
Attending: ORTHOPAEDIC SURGERY
Payer: OTHER MISCELLANEOUS

## 2019-10-11 DIAGNOSIS — R29.898 RIGHT LEG WEAKNESS: ICD-10-CM

## 2019-10-11 DIAGNOSIS — R53.1 DECREASED STRENGTH: ICD-10-CM

## 2019-10-11 DIAGNOSIS — S83.511D RUPTURE OF ANTERIOR CRUCIATE LIGAMENT OF RIGHT KNEE, SUBSEQUENT ENCOUNTER: Primary | ICD-10-CM

## 2019-10-11 DIAGNOSIS — M25.60 LIMITED JOINT RANGE OF MOTION (ROM): ICD-10-CM

## 2019-10-11 DIAGNOSIS — R26.9 GAIT DISTURBANCE: ICD-10-CM

## 2019-10-11 PROBLEM — M25.561 RIGHT KNEE PAIN: Status: ACTIVE | Noted: 2019-10-11

## 2019-10-11 PROCEDURE — 97032 APPL MODALITY 1+ESTIM EA 15: CPT | Mod: PN

## 2019-10-11 PROCEDURE — 97110 THERAPEUTIC EXERCISES: CPT | Mod: PN

## 2019-10-11 NOTE — PROGRESS NOTES
Name: Nilsa York  Clinic Number: [id number]  Date of Treatment: 10/11/2019   Diagnosis:   Encounter Diagnoses   Name Primary?    Rupture of anterior cruciate ligament of right knee, subsequent encounter Yes    Right leg weakness     Gait disturbance     Limited joint range of motion (ROM)     Decreased strength        Time in: 1205  Time Out: 1255  Total Treatment Time: 50  Group Time:       Subjective:    Nilsa reports increased pain.  Patient reports their pain to be 8/10 on a 0-10 scale with 0 being no pain and 10 being the worst pain imaginable. Reports that she has been moving around a lot as of lately. States that she has trouble resting. Reports to have fallen approximately 3 weeks ago while climbing a step to enter a store.     Objective    Nilsa received therapeutic exercises to develop strength, endurance and ROM for 40 minutes including:    QS and SLR x 5' ea on NMES   Bridge over bolster x 20   Side lying Clam shells x 20 ea   Standing TKE GTB x 20   Bosu Lunge x 20   Gastroc stretch 2 x 30 sec   Seated HS stretch 2 x 30 sec   AAROM in supine x 20 flexion/ext    The patient received the following supervised modalities after being cleared for contradictions: NMES Electrical Stimulation:  Nilsa received NMES Electrical Stimulation to elicit muscle contraction of the quads. Pt received co-contraction of qauds for 10 second on time and 10 second off time for 10 minutes. Patient tolerated treatment well without any adverse effects.      Assessment:   Pt progressing with ROM at this time. Exhibiting full active extension of R knee. Exhibiting increased tenderness to palpation surrounding patella and at incision sites. Unable to tolerate patellar mobilizations today secondary to discomfort. Did demo decreased patellar mob. Patient remains limited by pain and decreased strength. Atrophy of R quad noted upon observation. Patient remains appropriate for skilled PT services to address strength,  ROM, balance, and proprioception deficits.    Patient is making good progress towards established goals.      Plan:  Continue with established Plan of Care towards PT goals.    Favian Isbell,SPT/Deniz Bui, PT  10/11/2019    I certify that I was present in the room directing the student in service delivery and guiding them using my skilled judgment. As the co-signing therapist I have reviewed the students documentation and am responsible for the treatment, assessment, and plan.

## 2019-10-14 ENCOUNTER — CLINICAL SUPPORT (OUTPATIENT)
Dept: REHABILITATION | Facility: HOSPITAL | Age: 44
End: 2019-10-14
Attending: ORTHOPAEDIC SURGERY
Payer: OTHER MISCELLANEOUS

## 2019-10-14 DIAGNOSIS — R26.9 GAIT DISTURBANCE: ICD-10-CM

## 2019-10-14 DIAGNOSIS — R53.1 DECREASED STRENGTH: ICD-10-CM

## 2019-10-14 DIAGNOSIS — M25.561 RIGHT KNEE PAIN, UNSPECIFIED CHRONICITY: ICD-10-CM

## 2019-10-14 DIAGNOSIS — M25.60 LIMITED JOINT RANGE OF MOTION (ROM): ICD-10-CM

## 2019-10-14 DIAGNOSIS — R29.898 RIGHT LEG WEAKNESS: ICD-10-CM

## 2019-10-14 PROCEDURE — 97110 THERAPEUTIC EXERCISES: CPT | Mod: PN

## 2019-10-14 PROCEDURE — 97032 APPL MODALITY 1+ESTIM EA 15: CPT | Mod: PN

## 2019-10-14 NOTE — PROGRESS NOTES
"Name: Nilsa York  Clinic Number: [id number]  Date of Treatment: 10/14/2019   Diagnosis:   Encounter Diagnoses   Name Primary?    Right knee pain, unspecified chronicity     Right leg weakness     Gait disturbance     Limited joint range of motion (ROM)     Decreased strength        Time in: 1150  Time Out: 1255  Total Treatment Time: 65  Group Time: 0      Subjective:    Nilsa reports improvement of symptoms.  Patient reports their pain to be 4/10 on a 0-10 scale with 0 being no pain and 10 being the worst pain imaginable.    Objective    Nilsa received therapeutic exercises to R LE to develop strength, endurance and flexibility for 50 minutes including:     Bike Lv4 10'  Hamstring stretch 3x30" long sit R/L  Gastroc stretch 3x30" 1/2 roll B  // bar x30 Airex: HR and mini squats  Hip 4-way 3x10 R on mat  TKE 4" box x30  Step up 6" box x30 R  Side step up/over 6" box x30 R<>L     The patient received the following supervised modalities after being cleared for contradictions: IFC Electrical Stimulation:  Nilsa received IFC Electrical Stimulation for pain control applied to the R Knee along with hot pack with extra towel.  Pt received stimulation at a frequency of 12.0 mA for 15 minutes. Nilsa tolderated treatment well without any adverse effects.      Pt demo good understanding of the education provided. Nilsa demonstrated good return demonstration of activities with cues for direction and proper form.     Assessment:     Pt will continue to benefit from skilled PT intervention. Medical Necessity is demonstrated by:  Continued inability to participate in vocational pursuits, Pain limits function of effected part for some activities, Unable to participate fully in daily activities, Requires skilled supervision to complete and progress HEP and Weakness.    Patient is making good progress towards established goals.    Plan:    Continue with established Plan of Care towards PT goals.   "

## 2019-10-16 ENCOUNTER — CLINICAL SUPPORT (OUTPATIENT)
Dept: REHABILITATION | Facility: HOSPITAL | Age: 44
End: 2019-10-16
Attending: ORTHOPAEDIC SURGERY
Payer: OTHER MISCELLANEOUS

## 2019-10-16 DIAGNOSIS — M25.60 LIMITED JOINT RANGE OF MOTION (ROM): ICD-10-CM

## 2019-10-16 DIAGNOSIS — R53.1 DECREASED STRENGTH: ICD-10-CM

## 2019-10-16 DIAGNOSIS — R26.9 GAIT DISTURBANCE: ICD-10-CM

## 2019-10-16 DIAGNOSIS — R29.898 RIGHT LEG WEAKNESS: ICD-10-CM

## 2019-10-16 DIAGNOSIS — S83.511D RUPTURE OF ANTERIOR CRUCIATE LIGAMENT OF RIGHT KNEE, SUBSEQUENT ENCOUNTER: Primary | ICD-10-CM

## 2019-10-16 PROCEDURE — 97010 HOT OR COLD PACKS THERAPY: CPT | Mod: PN

## 2019-10-16 PROCEDURE — 97110 THERAPEUTIC EXERCISES: CPT | Mod: PN

## 2019-10-16 NOTE — PROGRESS NOTES
OUTPATIENT PHYSICAL         THERAPY TREATMENT NOTE        TIME RECORD     Nilsa York  10/16/2019     Start Time:  0905  Stop Time:  1000    Problem List Items Addressed This Visit        Orthopedic    Right ACL tear - Primary    Limited joint range of motion (ROM)    Right leg weakness       Other    Gait disturbance    Decreased strength           Subjective:  Nilsa reports elevated pain levels in her rt knee.  Patient reports her pain to be 7/10 on a 0-10 scale with 0 being no pain and 10 being the worst pain imaginable.        --------------------------------------------------------------------------------------------------------------------------    Treatment:     Patient received therapeutic exercises to develop strength, endurance and proprioception for 45 minutes including:      X 10 min bike (L4)   X 20 rt knee QS   X 20 rt knee assisted HS w/ sheet   X 20 rt LE SLR (1#)   X 20 rt knee SAQ (3#)   X 20 ea standing rt LE 4-way SLR (2#)   2 x 20 shuttle mini sq and HR (56#)   X 20 SportsArt knee curl (22#)   2 x 30 sec rt SLS on blue pad      The patient received the following direct contact modalities for 10 minutes after being cleared for contraindications: ice to rt knee      --------------------------------------------------------------------------------------------------------------------------     Assessment:  Rt knee AROM (supine) has increased to +5* to 143*.  Patient was able to tolerate both open and closed chain there ex w/ a mild increase in her symptoms.  Patient's pain was increased to 8/10.      ---------------------------------------------------------------------------------------------------------------------------     Plan for Next Visit:  Continue to progress ROM/strength/proprioception there ex to patient's tolerance.         Therapist's Name: Deniz Bui, PT  10/16/2019

## 2019-10-18 ENCOUNTER — CLINICAL SUPPORT (OUTPATIENT)
Dept: REHABILITATION | Facility: HOSPITAL | Age: 44
End: 2019-10-18
Attending: ORTHOPAEDIC SURGERY
Payer: OTHER MISCELLANEOUS

## 2019-10-18 DIAGNOSIS — R26.9 GAIT DISTURBANCE: ICD-10-CM

## 2019-10-18 DIAGNOSIS — R29.898 RIGHT LEG WEAKNESS: ICD-10-CM

## 2019-10-18 DIAGNOSIS — R53.1 DECREASED STRENGTH: ICD-10-CM

## 2019-10-18 DIAGNOSIS — M25.60 LIMITED JOINT RANGE OF MOTION (ROM): ICD-10-CM

## 2019-10-18 PROCEDURE — 97110 THERAPEUTIC EXERCISES: CPT | Mod: PN

## 2019-10-18 NOTE — PROGRESS NOTES
"Name: Nilsa York  Clinic Number: [id number]  Date of Treatment: 10/18/2019   Diagnosis:   Encounter Diagnoses   Name Primary?    Right leg weakness     Gait disturbance     Limited joint range of motion (ROM)     Decreased strength        Time in: 1001  Time Out: 1100  Total Treatment Time: 59    Subjective:    Nilsa reports medial R knee discomfort.  Patient reports their pain to be 5/10 on a 0-10 scale with 0 being no pain and 10 being the worst pain imaginable.  Wearing R knee support sleeve. "I'm surprised I didn't hurt my knee Wednesday after I left therapy." Was going fishing but her friend cancelled, so she went out to an old hangout that had a "pole", stating she danced on the pole that night.     Objective:    Patient received individual therapy to increase strength, endurance, ROM, flexibility, posture and core stabilization with activities as follows:     Nilsa received therapeutic exercises to develop strength, endurance, ROM, flexibility, posture and core stabilization for 59 minutes including:     Bike x 10' L4  Shuttle B leg press 56# 20/2   Shuttle B calf press 20/2 56#  Standing HR/TR airex 10/2  Standing minisquats 10/2 airex  Blue pad SLS 3/30s L/R  Lat CKC TKE to 2" step 10/2 R  Fwd step ups R 10/2 to 6"  Lat step ups to 6" 10/2 R  Long sitting R hamstring stretches 3/30s  Supine 4 way hip R 1# 10/2  Supine HS with sheet 10/2    Continue HEP daily.    Pt demo good understanding of the education provided. Patient demonstrated good return demonstration of activities.     Assessment:     Progressing with increased challenges without increase in discomfort. Consistent improvement in ROM.     Pt will continue to benefit from skilled PT intervention. Medical Necessity is demonstrated by:  Requires skilled supervision to complete and progress HEP and Weakness.    Patient is making good progress towards established goals.    Plan:    Continue with established Plan of Care towards PT goals. "

## 2019-10-21 ENCOUNTER — CLINICAL SUPPORT (OUTPATIENT)
Dept: REHABILITATION | Facility: HOSPITAL | Age: 44
End: 2019-10-21
Attending: ORTHOPAEDIC SURGERY
Payer: OTHER MISCELLANEOUS

## 2019-10-21 DIAGNOSIS — R29.898 RIGHT LEG WEAKNESS: ICD-10-CM

## 2019-10-21 DIAGNOSIS — R53.1 DECREASED STRENGTH: ICD-10-CM

## 2019-10-21 DIAGNOSIS — M25.60 LIMITED JOINT RANGE OF MOTION (ROM): ICD-10-CM

## 2019-10-21 DIAGNOSIS — S83.511D RUPTURE OF ANTERIOR CRUCIATE LIGAMENT OF RIGHT KNEE, SUBSEQUENT ENCOUNTER: Primary | ICD-10-CM

## 2019-10-21 DIAGNOSIS — R26.9 GAIT DISTURBANCE: ICD-10-CM

## 2019-10-21 PROCEDURE — 97110 THERAPEUTIC EXERCISES: CPT | Mod: PN

## 2019-10-21 PROCEDURE — 97010 HOT OR COLD PACKS THERAPY: CPT | Mod: PN

## 2019-10-21 PROCEDURE — 97112 NEUROMUSCULAR REEDUCATION: CPT | Mod: PN

## 2019-10-21 NOTE — PROGRESS NOTES
OUTPATIENT PHYSICAL         THERAPY TREATMENT NOTE        TIME RECORD     Nilsa York  10/21/2019     Start Time:  1300  Stop Time:  1355    Problem List Items Addressed This Visit        Orthopedic    Right ACL tear - Primary    Limited joint range of motion (ROM)    Right leg weakness       Other    Gait disturbance    Decreased strength           Subjective:  Nilsa reports improvement of her rt knee symptoms.  Patient reports her pain to be 4/10 on a 0-10 scale with 0 being no pain and 10 being the worst pain imaginable.        --------------------------------------------------------------------------------------------------------------------------    Treatment:     Patient received therapeutic exercises to develop strength, endurance and proprioception for 30 minutes including:                  X 10 min bike (L4)              X 20 rt knee QS              X 20 rt knee assisted HS w/ sheet              X 20 rt LE supine 4-way SLR (1#)                   2 x 20 shuttle mini sq and HR (62#)              X 20 SportsArt knee curl (33#)   X 20 sumo squats (3# t-bar)                   Patient participated in neuromuscular re-education activities to improve: Proprioception for 15 minutes. The following activities were included:      X 20 rt LE single leg toe reach   X 40 ft mini sq side stepping   X 100 ft lunge walking (rylie. 2# dumbbells)       The patient received the following direct contact modalities for 10 minutes after being cleared for contraindications: ice to rt knee      --------------------------------------------------------------------------------------------------------------------------     Assessment:  Ms. Jaylen was able to tolerate increased resistance w/ the shuttle mini squats and SportsArt knee curl.  Patient's pain was maintained at 4/10.      ---------------------------------------------------------------------------------------------------------------------------     Plan for Next Visit:   Continue to progress ROM/strength/proprioception there ex to patient's tolerance.         Therapist's Name: Deniz Bui, PT  10/21/2019

## 2019-10-22 ENCOUNTER — TELEPHONE (OUTPATIENT)
Dept: REHABILITATION | Facility: HOSPITAL | Age: 44
End: 2019-10-22

## 2019-10-28 ENCOUNTER — CLINICAL SUPPORT (OUTPATIENT)
Dept: REHABILITATION | Facility: HOSPITAL | Age: 44
End: 2019-10-28
Attending: ORTHOPAEDIC SURGERY
Payer: OTHER MISCELLANEOUS

## 2019-10-28 DIAGNOSIS — R29.898 RIGHT LEG WEAKNESS: ICD-10-CM

## 2019-10-28 DIAGNOSIS — R26.9 GAIT DISTURBANCE: ICD-10-CM

## 2019-10-28 DIAGNOSIS — M25.60 LIMITED JOINT RANGE OF MOTION (ROM): ICD-10-CM

## 2019-10-28 DIAGNOSIS — R53.1 DECREASED STRENGTH: ICD-10-CM

## 2019-10-28 PROCEDURE — 97110 THERAPEUTIC EXERCISES: CPT | Mod: PN

## 2019-10-28 NOTE — PROGRESS NOTES
"Name: Nilsa York  Clinic Number: [id number]  Date of Treatment: 10/28/2019   Diagnosis:   Encounter Diagnoses   Name Primary?    Right leg weakness     Gait disturbance     Limited joint range of motion (ROM)     Decreased strength        Time in: 1325  Time Out: 1400  Total Treatment Time: 25    Subjective:    Nilsa reports R knee discomfort.  Patient reports their pain to be 6/10 on a 0-10 scale with 0 being no pain and 10 being the worst pain imaginable. Late arrival 2*  times confusion. States she is considering having knee injection for pain. Wearing R knee support sleeve.     Objective    Patient received individual therapy to increase strength, endurance, ROM, flexibility, posture and core stabilization with activities as follows:     Nilsa received therapeutic exercises to develop strength, endurance, ROM, flexibility, posture and core stabilization for 25 minutes including:     Bike x 10' L4  Shuttle B leg press 56# 20/2   Shuttle B calf press 20/2 56#  Standing HR/TR airex 10/2  Standing minisquats 10/2 airex  Blue pad SLS 3/30s L/R  Lat CKC TKE to 2" step 10/2 R  Long sitting R hamstring stretches 3/30s    Continue HEP daily.    Pt demo good understanding of the education provided. Patient demonstrated good return demonstration of activities.     Assessment:     Limited 2* tardiness. No antalgia with gait.     Pt will continue to benefit from skilled PT intervention. Medical Necessity is demonstrated by:  Requires skilled supervision to complete and progress HEP and Weakness.    Patient is making good progress towards established goals.    Plan:    Continue with established Plan of Care towards PT goals.     "

## 2019-10-29 ENCOUNTER — CLINICAL SUPPORT (OUTPATIENT)
Dept: REHABILITATION | Facility: HOSPITAL | Age: 44
End: 2019-10-29
Attending: ORTHOPAEDIC SURGERY
Payer: OTHER MISCELLANEOUS

## 2019-10-29 DIAGNOSIS — R29.898 RIGHT LEG WEAKNESS: ICD-10-CM

## 2019-10-29 DIAGNOSIS — R26.9 GAIT DISTURBANCE: ICD-10-CM

## 2019-10-29 DIAGNOSIS — M25.60 LIMITED JOINT RANGE OF MOTION (ROM): ICD-10-CM

## 2019-10-29 DIAGNOSIS — M25.561 RIGHT KNEE PAIN, UNSPECIFIED CHRONICITY: ICD-10-CM

## 2019-10-29 DIAGNOSIS — R53.1 DECREASED STRENGTH: ICD-10-CM

## 2019-10-29 PROCEDURE — 97110 THERAPEUTIC EXERCISES: CPT | Mod: PN

## 2019-10-31 NOTE — PROGRESS NOTES
"Name: Nilsa York  Clinic Number: [id number]  Date of Treatment: 10/29/2019  Diagnosis:   Encounter Diagnoses   Name Primary?    Right knee pain, unspecified chronicity     Right leg weakness     Gait disturbance     Limited joint range of motion (ROM)     Decreased strength        Time in: 1002  Time Out: 1052  Total Treatment Time: 47  Group Time: 0      Subjective:    Nilsa reports "I'm not feeling too good today.  I was up a lot last night with a sick child".  Patient reports their pain to be 8/10 on a 0-10 scale with 0 being no pain and 10 being the worst pain imaginable.    Objective    Patient received individual therapy to increase strength, ROM and flexibility with 0 patients with activities as follows:     Nilsa received therapeutic exercises to develop strength, ROM and flexibility for 47 minutes including:    Bike L3 x 10;   Standing calf stretches 3x30s B   Mini squats with cues for correct technique 3z10   Step ups using 6 inch step x 30 R as power leg   Lateral step up and over using 6 inch step 3x10   Forward walking lunge x 90 feet   Seated hamstring stretch 3x30s with cues for technique   Shuttle leg press B with 62# resistance x 30    Ankle press B with 62# resistance x 30     Unilateral leg press with 25# x 30   Mat:  4 way SLR using 2# weight 3x10    Flexion    Abduction    Extension    Adduction   Prone knee flexion with 2# weight x 30   Quad sets x 30    Written Home Exercises Provided: Issues illustrated HEP instructions.    Pt demo good understanding of the education provided. Nilsa demonstrated good return demonstration of activities.     Assessment:   Patient demonstrates good control with lunges Ready to progress per protocol.      Pt will continue to benefit from skilled PT intervention. Medical Necessity is demonstrated by:  Unable to participate fully in daily activities, Requires skilled therapy to instruct in and advance home exercise program.    Patient is making " steady progress towards established goals.    New/Revised goals: N/A this date.     Short Term Goals:   1) Decrease max pain to < 5/10,  Not met  2) Improve AROM to 0*-130*, Nearly met  3) Pt will consistently demonstrate adequate knee flexion for swing through, Met  4) Pt will safely navigate 6 steps utilizing reciprocal pattern  Progressing  Long Term Goals:   1) Pt will demonstrate normalized vinicio for walking,  Met  2) Pt will demonstrate appropriate technique for mid squat to allow safe lifting mechanics,   Progressing  3) Pt will improve standing/walking tolerance to > 2 hours for performance of work related activities,   Progressing  4) Improve FOTO disability rating to < 25%,  Not assessed this date  5) Pt will be independent in HEP  Progressing    Plan:  Progress to plyometric activities as patient tolerates to facilitate stability of R knee for return to functional activities.

## 2019-10-31 NOTE — PATIENT INSTRUCTIONS
Gastroc / Plantar Fascia, Standing        Stand, one foot on wedge (slanted at about 30°), heel resting on floor. Keep toes straight and hands on wall. With leg straight, press entire body forward. Hold 30 seconds.  Repeat 3 times per session. Do 1 sessions per day.    Copyright © [employer]. All rights reserved.   Chair Sitting        Sit at edge of seat, spine straight, one leg extended. Put a hand on each thigh and bend forward from the hip, keeping spine straight. Allow hand on extended leg to reach toward toes. Support upper body with other arm. Hold 30 seconds.  Repeat 3 times per session. Do 1 sessions per day.    Copyright © [employer]. All rights reserved.   Functional Quadriceps: Chair Squat        Keeping feet flat on floor, shoulder width apart, squat as low as is comfortable. Use support as necessary.  Repeat 10 times per set. Do 3 sets per session. Do 1 sessions per day.     [email or web address]     Copyright © [employer]. All rights reserved.   Knee Flexion: Resisted (Standing)        With support, ____ pound weight around right ankle, slowly bend knee up. Return slowly.   Repeat 10 times per set. Do 3 sets per session. Do 1 sessions per day.     [email or web address]     Copyright © [employer]. All rights reserved.   Strengthening: Straight Leg Raise (Phase 1)        Tighten muscles on front of right thigh, then lift leg 18 inches from surface, keeping knee locked.   Repeat 10 times per set. Do 3 sets per session. Do 1 sessions per day.     [email or web address]     Copyright © [employer]. All rights reserved.   Strengthening: Hip Abduction (Side-Lying)        Tighten muscles on front of left thigh, then lift leg 18 inches from surface, keeping knee locked.   Repeat 10 times per set. Do 3 sets per session. Do 1 sessions per day.     [email or web address]     Copyright © [employer]. All rights reserved.   Strengthening: Hip Extension (Prone)        Tighten muscles on front of left thigh, then lift leg 12 inches from surface,  keeping knee locked.  Repeat 10 times per set. Do 3 sets per session. Do 1 sessions per day.     [email or web address]     Copyright © [employer]. All rights reserved.   Strengthening: Hip Adduction (Side-Lying)        Tighten muscles on front of right thigh, then lift leg 12 inches from surface, keeping knee locked.   Repeat 10 times per set. Do 3 sets per session. Do 1 sessions per day.     [email or web address]     Copyright © [employer]. All rights reserved.   Proprioception, Quad Strength, Coordination: Side Step-Up        Step up sideways with one foot, then the other. Step off other side the same way.  Use 6 inch step. Repeat 10 times per set, 3 sets per session. Do 1 session per day.     [email or web address]     Copyright © [employer]. All rights reserved.   Proprioception, Quad Strength, Timing, Coordination: Forward Step-Up        Move onto step with one foot, then the other. Step back off the same way.  Use 6 inch step. Repeat 10 times per set, 3 sets per session. Do 1 session per day.     [email or web address]     Copyright © [employer]. All rights reserved.

## 2019-11-05 ENCOUNTER — CLINICAL SUPPORT (OUTPATIENT)
Dept: REHABILITATION | Facility: HOSPITAL | Age: 44
End: 2019-11-05
Payer: OTHER MISCELLANEOUS

## 2019-11-05 DIAGNOSIS — R53.1 DECREASED STRENGTH: ICD-10-CM

## 2019-11-05 DIAGNOSIS — R29.898 RIGHT LEG WEAKNESS: ICD-10-CM

## 2019-11-05 DIAGNOSIS — M25.60 LIMITED JOINT RANGE OF MOTION (ROM): ICD-10-CM

## 2019-11-05 DIAGNOSIS — M25.561 RIGHT KNEE PAIN, UNSPECIFIED CHRONICITY: ICD-10-CM

## 2019-11-05 DIAGNOSIS — R26.9 GAIT DISTURBANCE: ICD-10-CM

## 2019-11-05 PROCEDURE — 97110 THERAPEUTIC EXERCISES: CPT | Mod: PN

## 2019-11-05 NOTE — PROGRESS NOTES
"Name: Nilsa York  Clinic Number: [id number]  Date of Treatment: 11/05/2019   Diagnosis:   Encounter Diagnoses   Name Primary?    Right knee pain, unspecified chronicity     Right leg weakness     Gait disturbance     Limited joint range of motion (ROM)     Decreased strength        Time in: 1100  Time Out: 1200  Total Treatment Time: 60  Group Time: no      Subjective:    Nilsa reports improvement of symptoms and decreased pain.  Patient reports their pain to be 3/10 on a 0-10 scale with 0 being no pain and 10 being the worst pain imaginable.    Objective    Nilsa received therapeutic exercises to develop strength, endurance, ROM and flexibility for 60 minutes including:    Bike L3 x 10;              Standing calf stretches over 1/2 roll 3x30s B              Mini squats with cues for correct technique 3x10              Step ups using 6 inch step x 30 R as power leg              Lateral step up and over using 6 inch step 3x10   SLS x 90" R              Forward walking lunge x 100 feet   Seated hamstring stretch 3x30s with cues for technique              Shuttle leg press B with 62# resistance x 30                          Ankle press B with 62# resistance x 30                          Unilateral leg press with 25# x 30   Sports-Arc hamstring curls, 33# x 30              Mat:  4 way SLR using 2# weight 3x10                          Flexion                          Abduction                          Extension                          Adduction              Prone knee flexion with 2# weight x 30              Quad sets x 30 with 5" hold at end range     Written Home Exercises Provided: continue with current  Pt demo good understanding of the education provided. Nilsa demonstrated good return demonstration of activities.     Assessment:     Patient improving with decreased pain and improved return to PLOF.  Continue with PT's POC.  Pt will continue to benefit from skilled PT intervention. Medical " Necessity is demonstrated by:  Requires skilled supervision to complete and progress HEP and Weakness.    Patient is making good progress towards established goals.    New/Revised goals: no      Plan:  Continue with established Plan of Care towards PT goals.

## 2019-11-08 ENCOUNTER — CLINICAL SUPPORT (OUTPATIENT)
Dept: REHABILITATION | Facility: HOSPITAL | Age: 44
End: 2019-11-08
Attending: ORTHOPAEDIC SURGERY
Payer: OTHER MISCELLANEOUS

## 2019-11-08 DIAGNOSIS — R26.9 GAIT DISTURBANCE: ICD-10-CM

## 2019-11-08 DIAGNOSIS — M25.60 LIMITED JOINT RANGE OF MOTION (ROM): ICD-10-CM

## 2019-11-08 DIAGNOSIS — M25.561 RIGHT KNEE PAIN, UNSPECIFIED CHRONICITY: ICD-10-CM

## 2019-11-08 DIAGNOSIS — R29.898 RIGHT LEG WEAKNESS: ICD-10-CM

## 2019-11-08 DIAGNOSIS — R53.1 DECREASED STRENGTH: ICD-10-CM

## 2019-11-08 PROCEDURE — 97110 THERAPEUTIC EXERCISES: CPT | Mod: PN

## 2019-11-12 NOTE — PLAN OF CARE
Outpatient Therapy Updated Plan of Care     Visit Date: 11/8/2019    Name: Nilsa York  Clinic Number: [id number]    Therapy Diagnosis:   Encounter Diagnoses   Name Primary?    Right knee pain, unspecified chronicity     Right leg weakness     Gait disturbance     Limited joint range of motion (ROM)     Decreased strength      Physician: Sd Trujillo MD    Physician Orders: PT Eval and Treat   Medical Diagnosis from Referral: Rupture of ACL R knee, initial encounter.   Evaluation Date: 11/8/2019  Current Certification Period:  10/04/2019 to 11/15/2019  Authorization Period Expiration: 12/08/2019  Plan of Care Expiration: 11/15/2019  Visit # / Visits authorized: 12/ 12    Precautions: Standard and ACL protocol  Functional Level Prior to Evaluation:  Unable to work, limited walking, unable to squat, kneel, is able to perform light housework and cooking but has not been able to return to full activity. Unable to participate in recreational activities.  Difficulty with tub transfers.  Interferes with sexual activity    Subjective     Update:   Pain: current 6/10, best 4/10, worst 8/10 (after participating in yard work)   Reports continued paresthesias around R knee and occasional swelling.    Objective     Update:   AROM R knee 0* - 120*  PROM R knee 0* - 140*   Strength:  R hip 4+/5, knee extension 4/5, flexion 4+/5    Assessment     Update: Patient is making steady progress in PT with increased A/PROM, increased strength, and improved functional mobility.  However, she continues to report knee pain, tenderness, and paresthesias.  Her limitations are sufficient to interfere with safe return to work activities.      Previous Short Term Goals Status:      1) Decrease max pain to < 5/10, Not progressing   2) Improve AROM to 0*-130*, Progressing   3) Pt will consistently demonstrate adequate knee flexion for swing through, MET   4) Pt will safely navigate 6 steps utilizing reciprocal pattern  MET      New  Short Term Goals Status:      1) Decrease max pain to < 5/10 continued   2) Improve AROM to 0*-130* continued   3) Patient will demonstrate safe lifting technique for knee to thigh    Long Term Goal Status:   continue per initial plan of care.   1) Pt will demonstrate normalized vinicio for walking, MET (11/8/2019)    2) Pt will demonstrate appropriate technique for mid squat to allow safe lifting mechanics,  Continued   3) Pt will improve standing/walking tolerance to > 2 hours for performance of work related activities, Continued   4) Improve FOTO disability rating to < 25%, Continued   5) Pt will be independent in HEP.   Continued   6) Pt will prepare to return to work.      Reasons for Recertification of Therapy:   Patient is making steady progress in PT with improved ROM, increased strength, decreased frequency of swelling, and increased functional mobility; however, she continues to experience significant knee pain, intermittent swelling, and impaired functional mobility which limits her ability to return to work activities.      Plan     Updated Certification Period: 11/8/2019 to 12/27/2019  Recommended Treatment Plan: 2 times per week for 6 weeks: Electrical Stimulation IFC/NMES, Gait Training, Manual Therapy, Moist Heat/ Ice, Patient Education, Therapeutic Activites, Therapeutic Exercise and Therapeutic taping  Other Recommendations: Patient may benefit from dry needling to assist with symptom management    Ebony Barone, PT  11/8/2019      I CERTIFY THE NEED FOR THESE SERVICES FURNISHED UNDER THIS PLAN OF TREATMENT AND WHILE UNDER MY CARE    Physician's comments:        Physician's Signature: ___________________________________________________

## 2019-11-12 NOTE — PROGRESS NOTES
"Name: Nilsa York  Clinic Number: [id number]  Date of Treatment: 11/8/2019  Diagnosis:   Encounter Diagnoses   Name Primary?    Right knee pain, unspecified chronicity     Right leg weakness     Gait disturbance     Limited joint range of motion (ROM)     Decreased strength        Time in: 1615  Time Out: 1702  Total Treatment Time: 40  Group Time: 0      Subjective:    Nilsa reports "This has definitely helped.  But it still hurts."    Objective    Patient received individual therapy to increase strength, ROM and flexibility with 0 patients with activities as follows:     Nilsa received therapeutic exercises to develop strength, ROM and flexibility for 40 minutes including:    Bike L4 x 10;   Step ups using 8 inch step x 30 R as power leg   Lateral step up and over using 8 inch step 3x10   Forward walking lunge x 100 feet   Shuttle leg press B with 62# resistance 2 x 20    Ankle press B with 62# resistance 2 x 20     Unilateral leg press with 62# 2 x 20   Hamstring curls on  with 33# x 30   Initiated resistant band training:  Lateral stepping resisted with blue theratube x 40 feet each direction    Written Home Exercises Provided: follow exercise program as previously instructed.    Pt demo good understanding of the education provided. Nilsa demonstrated good return demonstration of activities.    Assessment:   Progressed to resistant band exercises in gait activities.  Required verbal cues to increase excursion of motion.  Continues to report pain with daily activities.      Pt will continue to benefit from skilled PT intervention. Medical Necessity is demonstrated by:  Unable to participate fully in daily activities, Requires skilled therapy to instruct in and advance home exercise program.    Patient is making steady progress towards established goals.    Short Term Goals:   1) Decrease max pain to < 5/10,  No progress  2) Improve AROM to 0*-130*, Nearly met (acheved 0*-120*)  3) Pt will " consistently demonstrate adequate knee flexion for swing through, Met  4) Pt will safely navigate 6 steps utilizing reciprocal pattern  Nearly met  Long Term Goals:   1) Pt will demonstrate normalized vinicio for walking,  Met  2) Pt will demonstrate appropriate technique for mid squat to allow safe lifting mechanics,   Progressing  3) Pt will improve standing/walking tolerance to > 2 hours for performance of work related activities,   Progressing  4) Improve FOTO disability rating to < 25%,  Progressing  5) Pt will be independent in HEP  Progressing    Plan:  Patient would benefit from continued PT for progressive strengthening, knee stabilization, body mechanics in preparation for return to work.

## 2019-11-13 ENCOUNTER — OFFICE VISIT (OUTPATIENT)
Dept: ORTHOPEDICS | Facility: CLINIC | Age: 44
End: 2019-11-13
Payer: OTHER MISCELLANEOUS

## 2019-11-13 VITALS
SYSTOLIC BLOOD PRESSURE: 162 MMHG | BODY MASS INDEX: 28.53 KG/M2 | HEART RATE: 78 BPM | HEIGHT: 64 IN | DIASTOLIC BLOOD PRESSURE: 92 MMHG | WEIGHT: 167.13 LBS

## 2019-11-13 DIAGNOSIS — Z98.890 S/P ACL RECONSTRUCTION: Primary | ICD-10-CM

## 2019-11-13 PROCEDURE — 99024 PR POST-OP FOLLOW-UP VISIT: ICD-10-PCS | Mod: S$GLB,,, | Performed by: ORTHOPAEDIC SURGERY

## 2019-11-13 PROCEDURE — 99024 POSTOP FOLLOW-UP VISIT: CPT | Mod: S$GLB,,, | Performed by: ORTHOPAEDIC SURGERY

## 2019-11-13 PROCEDURE — 99999 PR PBB SHADOW E&M-EST. PATIENT-LVL III: ICD-10-PCS | Mod: PBBFAC,,, | Performed by: ORTHOPAEDIC SURGERY

## 2019-11-13 PROCEDURE — 99999 PR PBB SHADOW E&M-EST. PATIENT-LVL III: CPT | Mod: PBBFAC,,, | Performed by: ORTHOPAEDIC SURGERY

## 2019-11-13 NOTE — PROGRESS NOTES
Past Medical History:   Diagnosis Date    Encounter for blood transfusion     Hypertension        Past Surgical History:   Procedure Laterality Date    CHOLECYSTECTOMY      Gastric sleeve      KNEE ARTHROSCOPY W/ ACL RECONSTRUCTION Right 8/22/2019    Procedure: RECONSTRUCTION, KNEE, ACL, ARTHROSCOPIC;  Surgeon: Sd Trujillo MD;  Location: [facility];  Service: Orthopedics;  Laterality: Right;  anesthesia:  general and block    TUBAL LIGATION         Current Outpatient Medications   Medication Sig    acetaminophen (TYLENOL) 650 MG TbSR Take 650 mg by mouth every 8 (eight) hours.    ibuprofen (ADVIL,MOTRIN) 800 MG tablet Take 1 tablet (800 mg total) by mouth 3 (three) times daily.    lisinopril (PRINIVIL,ZESTRIL) 40 MG tablet Take 40 mg by mouth once daily.    topiramate (TOPAMAX) 100 MG tablet Take 200 mg by mouth 2 (two) times daily.    venlafaxine (EFFEXOR-XR) 150 MG Cp24 Take 150 mg by mouth once daily.     oxyCODONE-acetaminophen (PERCOCET)  mg per tablet Take 1 tablet by mouth every 6 (six) hours as needed for Pain. Quantity medically necessary (Patient not taking: Reported on 11/13/2019)    traMADol (ULTRAM) 50 mg tablet Take 1 tablet (50 mg total) by mouth every 6 (six) hours as needed for Pain. (Patient not taking: Reported on 11/13/2019)     No current facility-administered medications for this visit.      Facility-Administered Medications Ordered in Other Visits   Medication    lactated ringers infusion    sodium chloride 0.9% flush 3 mL       Review of patient's allergies indicates:  No Known Allergies    History reviewed. No pertinent family history.    Social History     Socioeconomic History    Marital status:      Spouse name: Not on file    Number of children: Not on file    Years of education: Not on file    Highest education level: Not on file   Occupational History    Not on file   Social Needs    Financial resource strain: Not on file    Food insecurity:      Worry: Not on file     Inability: Not on file    Transportation needs:     Medical: Not on file     Non-medical: Not on file   Tobacco Use    Smoking status: Never Smoker    Smokeless tobacco: Never Used   Substance and Sexual Activity    Alcohol use: No    Drug use: No    Sexual activity: Yes     Partners: Male   Lifestyle    Physical activity:     Days per week: Not on file     Minutes per session: Not on file    Stress: Not on file   Relationships    Social connections:     Talks on phone: Not on file     Gets together: Not on file     Attends Che[employer] service: Not on file     Active member of club or organization: Not on file     Attends meetings of clubs or organizations: Not on file     Relationship status: Not on file   Other Topics Concern    Not on file   Social History Narrative    Not on file       Chief Complaint:   Chief Complaint   Patient presents with    Post-op Evaluation     S/P RT KNEE ACL REPAIR 8/22/19       Consulting Physician: No ref. provider found    History of present illness: This is a 44 y.o. female following up for right ACL reconstruction on 08/22/2019.      Review of Systems:    Constitution: Denies chills, fever, and sweats.    HENT: Denies headaches or blurry vision.    Cardiovascular: Denies chest pain or irregular heart beat.    Respiratory: Denies cough or shortness of breath.    Gastrointestinal: Denies abdominal pain, nausea, or vomiting.    Musculoskeletal:  Denies muscle cramps.    Neurological: Denies dizziness or focal weakness.    Psychiatric/Behavioral: Normal mental status.    Hematologic/Lymphatic: Denies bleeding problem or easy bruising/bleeding.    Skin: Denies rash or suspicious lesions.      Examination:    Vital Signs:    Vitals:    11/13/19 0951   BP: (!) 162/92   Pulse: 78       Body mass index is 28.68 kg/m².    This a well-developed, well nourished patient in no acute distress.    Alert and oriented and cooperative to examination.       Physical  Exam:  Right knee    Inspection/Observation   Swelling:   None  Erythema:   none  Bruising:   none  Wounds:   Healed  Drainage:  None    Range of Motion   Full    Muscle Strength   4+5/5    Other   Sensation:  normal  Pulse:    palpable    Imaging:      Assessment: S/P ACL reconstruction        Plan:  She is doing well.  Her pain is up to 7/10 and worse at night.  She reports that the knee occasionally hyperextends while she is walking which indicates quad weakness. We will continue therapy here.  Back in 6 weeks.  She is released for light duty only.      DISCLAIMER: This note may have been dictated using voice recognition software and may contain grammatical errors. Report sent to referring provider as required.

## 2019-12-04 ENCOUNTER — CLINICAL SUPPORT (OUTPATIENT)
Dept: REHABILITATION | Facility: HOSPITAL | Age: 44
End: 2019-12-04
Attending: ORTHOPAEDIC SURGERY
Payer: OTHER MISCELLANEOUS

## 2019-12-04 DIAGNOSIS — R53.1 DECREASED STRENGTH: ICD-10-CM

## 2019-12-04 DIAGNOSIS — R26.9 GAIT DISTURBANCE: ICD-10-CM

## 2019-12-04 DIAGNOSIS — R29.898 RIGHT LEG WEAKNESS: ICD-10-CM

## 2019-12-04 DIAGNOSIS — M25.60 LIMITED JOINT RANGE OF MOTION (ROM): ICD-10-CM

## 2019-12-04 DIAGNOSIS — M25.561 RIGHT KNEE PAIN, UNSPECIFIED CHRONICITY: ICD-10-CM

## 2019-12-04 PROCEDURE — 97110 THERAPEUTIC EXERCISES: CPT | Mod: PN

## 2019-12-04 NOTE — PROGRESS NOTES
"  Physical Therapy Daily Treatment Note     Name: Nilsa York  Clinic Number: [id number]    Therapy Diagnosis:   Encounter Diagnoses   Name Primary?    Right knee pain, unspecified chronicity     Right leg weakness     Gait disturbance     Limited joint range of motion (ROM)     Decreased strength      Physician: Sd Trujillo MD    Visit Date: 12/4/2019    Physician Orders: PT Eval and Treat   Medical Diagnosis from Referral: Rupture of ACL R knee, initial encounter.   Evaluation Date: 11/8/2019  Current Certification Period:  10/04/2019 to 11/15/2019  Authorization Period Expiration: 12/08/2019  Plan of Care Expiration: 11/15/2019  Visit # / Visits authorized: 1/ 12    Time In: 1115  Time Out: 1150  Total Billable Time: 35 minutes    Precautions: ACL Protocol    Subjective     Pt reports: No feeling well today and may be coming down with an illness. Patient reports having patella pain occasionally when bending and at night.   She was compliant with home exercise program.  Response to previous treatment: No complaints  Functional change: Improved mobility    Pain: 0/10      Objective     Nilsa received therapeutic exercises to develop strength and endurance for 35 minutes including:    Bike Lv4 10'  Shuttle 62# x30: B LP x2; HR  CKC 6" box 3x10  Sides step Up/Over BOSU Round x16 R<>L (c/o nausea, treatment ended)    Kinesio tape to R for patella tendon pain.    Home Exercises Provided and Patient Education Provided     Education provided:   - Decreased activities with increased fatigue.    Written Home Exercises Provided: Patient instructed to cont prior HEP.  Exercises were reviewed and Nilsa was able to demonstrate them prior to the end of the session.  Nilsa demonstrated good  understanding of the education provided.     See EMR under Patient Instructions for exercises provided prior visit.    Assessment     Patient with decreased activities 2nd to not feeling well.    Nilsa is " progressing well towards her goals.     Pt will continue to benefit from skilled outpatient physical therapy to address the deficits listed in the problem list box on initial evaluation, provide pt/family education and to maximize pt's level of independence in the home and community environment.     Pt's spiritual, cultural and educational needs considered and pt agreeable to plan of care and goals.     Goals:     Short Term Goals:   1) Decrease max pain to < 5/10,  No progress  2) Improve AROM to 0*-130*, Nearly met (acheved 0*-120*)  3) Pt will consistently demonstrate adequate knee flexion for swing through, Met  4) Pt will safely navigate 6 steps utilizing reciprocal pattern  Nearly met  Long Term Goals:   1) Pt will demonstrate normalized vinicio for walking,  Met  2) Pt will demonstrate appropriate technique for mid squat to allow safe lifting mechanics,   Progressing  3) Pt will improve standing/walking tolerance to > 2 hours for performance of work related activities,   Progressing  4) Improve FOTO disability rating to < 25%,  Progressing  5) Pt will be independent in HEP  Progressing    Plan     Continue with POC to increase activities as patient tolerates.    Kylie Brady, PTA

## 2019-12-11 ENCOUNTER — CLINICAL SUPPORT (OUTPATIENT)
Dept: REHABILITATION | Facility: HOSPITAL | Age: 44
End: 2019-12-11
Attending: ORTHOPAEDIC SURGERY
Payer: OTHER MISCELLANEOUS

## 2019-12-11 DIAGNOSIS — M25.561 RIGHT KNEE PAIN, UNSPECIFIED CHRONICITY: ICD-10-CM

## 2019-12-11 DIAGNOSIS — R53.1 DECREASED STRENGTH: ICD-10-CM

## 2019-12-11 DIAGNOSIS — M25.60 LIMITED JOINT RANGE OF MOTION (ROM): ICD-10-CM

## 2019-12-11 DIAGNOSIS — R29.898 RIGHT LEG WEAKNESS: ICD-10-CM

## 2019-12-11 DIAGNOSIS — R26.9 GAIT DISTURBANCE: ICD-10-CM

## 2019-12-11 PROCEDURE — 97110 THERAPEUTIC EXERCISES: CPT | Mod: PN

## 2019-12-11 NOTE — PROGRESS NOTES
"  Physical Therapy Daily Treatment Note     Name: Nilsa York  Clinic Number: [id number]    Therapy Diagnosis:   Encounter Diagnoses   Name Primary?    Right knee pain, unspecified chronicity     Right leg weakness     Gait disturbance     Limited joint range of motion (ROM)     Decreased strength      Physician: Sd Trujillo MD    Visit Date: 12/11/2019    Physician Orders: PT Eval and Treat   Medical Diagnosis from Referral: Rupture of ACL R knee, initial encounter.   Evaluation Date: 11/8/2019  Current Certification Period:  10/04/2019 to 11/15/2019  Authorization Period Expiration: 12/08/2019  Plan of Care Expiration: 11/15/2019  Visit # / Visits authorized: 2/ 12    Time In: 1125  Time Out: 1205  Total Billable Time: 40 minutes    Precautions: ACL protocol    Subjective     Pt reports: Been fighting illness since past week and starting to feel a little better.  She was compliant with home exercise program.  Response to previous treatment: no problems  Functional change: increased mobility, reports going back to work on light duty    Pain: 4/10  Location: right knee      Objective     Nilsa received therapeutic exercises to develop strength, endurance and flexibility for 40 minutes including:    Bike Lv4 10'  Hamstring stretch 3x30" sit R/L  Gastroc stretch 3x30" 1/2 roll B  CKE x20 6" box R/L  Shuttle 62# 2x30: B LP and B HR  Sports Art x30 22# B flexion    Home Exercises Provided and Patient Education Provided     Education provided:   - Cues for increased range and decreased speed.    Written Home Exercises Provided: Patient instructed to cont prior HEP.  Exercises were reviewed and Nilsa was able to demonstrate them prior to the end of the session.  Nilsa demonstrated good  understanding of the education provided.     See EMR under Patient Instructions for exercises provided prior visit.    Assessment     Patient tolerated activities with rest breaks 2nd to fatigue.    Nilsa is " progressing well towards her goals.     Pt will continue to benefit from skilled outpatient physical therapy to address the deficits listed in the problem list box on initial evaluation, provide pt/family education and to maximize pt's level of independence in the home and community environment.     Pt's spiritual, cultural and educational needs considered and pt agreeable to plan of care and goals.     Goals: Progressing towards    Plan     Cont with POC to increase activities as patient tolerates.    Kylie Brady, PTA

## 2019-12-13 ENCOUNTER — CLINICAL SUPPORT (OUTPATIENT)
Dept: REHABILITATION | Facility: HOSPITAL | Age: 44
End: 2019-12-13
Payer: OTHER MISCELLANEOUS

## 2019-12-13 DIAGNOSIS — M25.561 RIGHT KNEE PAIN, UNSPECIFIED CHRONICITY: ICD-10-CM

## 2019-12-13 DIAGNOSIS — R29.898 RIGHT LEG WEAKNESS: ICD-10-CM

## 2019-12-13 DIAGNOSIS — M25.60 LIMITED JOINT RANGE OF MOTION (ROM): ICD-10-CM

## 2019-12-13 DIAGNOSIS — R26.9 GAIT DISTURBANCE: ICD-10-CM

## 2019-12-13 DIAGNOSIS — R53.1 DECREASED STRENGTH: ICD-10-CM

## 2019-12-13 PROCEDURE — 97110 THERAPEUTIC EXERCISES: CPT | Mod: PN

## 2019-12-17 NOTE — PROGRESS NOTES
"  Physical Therapy Daily Treatment Note     Name: Nilsa York  Clinic Number: [id number]    Therapy Diagnosis:   Encounter Diagnoses   Name Primary?    Right knee pain, unspecified chronicity     Right leg weakness     Gait disturbance     Limited joint range of motion (ROM)     Decreased strength      Physician: Sd Trujillo MD    Visit Date: 12/13/2019    Physician Orders: PT Eval and Treat   Medical Diagnosis from Referral: Rupture of ACL R knee, initial encounter.   Evaluation Date: 11/8/2019  Current Certification Period:  11/8/2019 to 12/27/2019  Authorization Period Expiration: 11/12/2020  Plan of Care Expiration: 12/27/2019  Visit # / Visits authorized: 3/ 12    Time In: 1115  Time Out: 1158  Total Billable Time: 40 minutes    Precautions: ACL protocol    Subjective     Pt reports: "sometimes it feels like it's gonna give out" .  She was somewhat compliant with home exercise program but limited by respiratory Illness  Response to previous treatment: Decreased soreness  Functional change: Released to return to work light duty.      Pain: 0/10    Objective     Nilsa received therapeutic exercises to develop strength, endurance, flexibility and stabilization of R knee for 40 minutes including:   Bike L4 x 10'   Standing gastroc stretches 3x30s ea   Mini squats 3x10   Heel/toe raises   Seated hamstring stretch 3x30s   Single leg stance 3x30s R LE only   Sports Art  knee flexion 33# x 30    Eccentric knee extension w/22# x 30   Closed kinetic chain terminal knee extension using 6" step x 30   4 way straight leg raise (recumbent) with 3# weight above the knee 3x10 each    Home Exercises Provided and Patient Education Provided     Education provided:   - to avoid knee hyperextension during activities    Written Home Exercises Provided: Patient instructed to cont prior HEP.  Exercises were reviewed and Nilsa was able to demonstrate them prior to the end of the session.  Nilsa demonstrated " good  understanding of the education provided.     See EMR under Patient Instructions for exercises provided prior visit.    Assessment     Patient continues to report occasional feeling of R knee buckling during activities.  She appears to be progressing well with ROM and strength.    Nilsa is progressing well towards her goals.   Pt prognosis is Good.     Pt will continue to benefit from skilled outpatient physical therapy to address the deficits listed in the problem list box on initial evaluation, provide pt/family education and to maximize pt's level of independence in the home and community environment.     Pt's spiritual, cultural and educational needs considered and pt agreeable to plan of care and goals.     Anticipated barriers to physical therapy: None known.      Goals: 1) Pt will demonstrate normalized vinicio for walking, MET (11/8/2019)               2) Pt will demonstrate appropriate technique for mid squat to allow safe lifting mechanics,  Progressing              3) Pt will improve standing/walking tolerance to > 2 hours for performance of work related activities, Progressing              4) Improve FOTO disability rating to < 25%, Not assessed this date              5) Pt will be independent in HEP.   Continued              6) Pt will prepare to return to work.  Progressing    Plan     Progressive strengthening exercises, proprioception/balance training.      Ebony Barone, PT

## 2019-12-27 ENCOUNTER — OFFICE VISIT (OUTPATIENT)
Dept: ORTHOPEDICS | Facility: CLINIC | Age: 44
End: 2019-12-27
Payer: OTHER MISCELLANEOUS

## 2019-12-27 VITALS
SYSTOLIC BLOOD PRESSURE: 150 MMHG | DIASTOLIC BLOOD PRESSURE: 91 MMHG | HEIGHT: 64 IN | BODY MASS INDEX: 28.51 KG/M2 | HEART RATE: 91 BPM | WEIGHT: 167 LBS

## 2019-12-27 DIAGNOSIS — Z98.890 S/P ACL RECONSTRUCTION: Primary | ICD-10-CM

## 2019-12-27 PROCEDURE — 99213 PR OFFICE/OUTPT VISIT, EST, LEVL III, 20-29 MIN: ICD-10-PCS | Mod: S$GLB,,, | Performed by: ORTHOPAEDIC SURGERY

## 2019-12-27 PROCEDURE — 99213 OFFICE O/P EST LOW 20 MIN: CPT | Mod: S$GLB,,, | Performed by: ORTHOPAEDIC SURGERY

## 2019-12-27 PROCEDURE — 99999 PR PBB SHADOW E&M-EST. PATIENT-LVL III: ICD-10-PCS | Mod: PBBFAC,,, | Performed by: ORTHOPAEDIC SURGERY

## 2019-12-27 PROCEDURE — 99999 PR PBB SHADOW E&M-EST. PATIENT-LVL III: CPT | Mod: PBBFAC,,, | Performed by: ORTHOPAEDIC SURGERY

## 2019-12-27 NOTE — PROGRESS NOTES
Past Medical History:   Diagnosis Date    Encounter for blood transfusion     Hypertension        Past Surgical History:   Procedure Laterality Date    CHOLECYSTECTOMY      Gastric sleeve      KNEE ARTHROSCOPY W/ ACL RECONSTRUCTION Right 8/22/2019    Procedure: RECONSTRUCTION, KNEE, ACL, ARTHROSCOPIC;  Surgeon: Sd Trujillo MD;  Location: [facility];  Service: Orthopedics;  Laterality: Right;  anesthesia:  general and block    TUBAL LIGATION         Current Outpatient Medications   Medication Sig    acetaminophen (TYLENOL) 650 MG TbSR Take 650 mg by mouth every 8 (eight) hours.    ibuprofen (ADVIL,MOTRIN) 800 MG tablet Take 1 tablet (800 mg total) by mouth 3 (three) times daily.    lisinopril (PRINIVIL,ZESTRIL) 40 MG tablet Take 40 mg by mouth once daily.    oxyCODONE-acetaminophen (PERCOCET)  mg per tablet Take 1 tablet by mouth every 6 (six) hours as needed for Pain. Quantity medically necessary    topiramate (TOPAMAX) 100 MG tablet Take 200 mg by mouth 2 (two) times daily.    traMADol (ULTRAM) 50 mg tablet Take 1 tablet (50 mg total) by mouth every 6 (six) hours as needed for Pain.    venlafaxine (EFFEXOR-XR) 150 MG Cp24 Take 150 mg by mouth once daily.      No current facility-administered medications for this visit.      Facility-Administered Medications Ordered in Other Visits   Medication    lactated ringers infusion    sodium chloride 0.9% flush 3 mL       Review of patient's allergies indicates:  No Known Allergies    History reviewed. No pertinent family history.    Social History     Socioeconomic History    Marital status:      Spouse name: Not on file    Number of children: Not on file    Years of education: Not on file    Highest education level: Not on file   Occupational History    Not on file   Social Needs    Financial resource strain: Not on file    Food insecurity:     Worry: Not on file     Inability: Not on file    Transportation needs:     Medical: Not on  file     Non-medical: Not on file   Tobacco Use    Smoking status: Never Smoker    Smokeless tobacco: Never Used   Substance and Sexual Activity    Alcohol use: No    Drug use: No    Sexual activity: Yes     Partners: Male   Lifestyle    Physical activity:     Days per week: Not on file     Minutes per session: Not on file    Stress: Not on file   Relationships    Social connections:     Talks on phone: Not on file     Gets together: Not on file     Attends Jewish service: Not on file     Active member of club or organization: Not on file     Attends meetings of clubs or organizations: Not on file     Relationship status: Not on file   Other Topics Concern    Not on file   Social History Narrative    Not on file       Chief Complaint:   Chief Complaint   Patient presents with    Post-op Evaluation     s/p right knee ACL 8/22/19       Consulting Physician: Sd Trujillo MD    History of present illness: This is a 44 y.o. female following up for right ACL reconstruction on 08/22/2019.      Review of Systems:    Constitution: Denies chills, fever, and sweats.    HENT: Denies headaches or blurry vision.    Cardiovascular: Denies chest pain or irregular heart beat.    Respiratory: Denies cough or shortness of breath.    Gastrointestinal: Denies abdominal pain, nausea, or vomiting.    Musculoskeletal:  Denies muscle cramps.    Neurological: Denies dizziness or focal weakness.    Psychiatric/Behavioral: Normal mental status.    Hematologic/Lymphatic: Denies bleeding problem or easy bruising/bleeding.    Skin: Denies rash or suspicious lesions.      Examination:    Vital Signs:    Vitals:    12/27/19 1103   BP: (!) 150/91   Pulse: 91       Body mass index is 28.67 kg/m².    This a well-developed, well nourished patient in no acute distress.    Alert and oriented and cooperative to examination.       Physical Exam:  Right knee    Inspection/Observation   Swelling:   None  Erythema:   none  Bruising:    none  Wounds:   Healed  Drainage:  None    Range of Motion   Full    Muscle Strength   4+5/5    Other   Sensation:  normal  Pulse:    palpable    Imaging:      Assessment: S/P ACL reconstruction        Plan:  She is doing well.  Her pain is up to 6/10.  She is still reporting episodes of the knee giving way.  We will continue therapy here with an emphasis on strength..  Back in 6 weeks.  She is released for light duty only.  She is also reporting some pain that runs from her back down her leg and we will get her a referral into the spine service to evaluate this.      DISCLAIMER: This note may have been dictated using voice recognition software and may contain grammatical errors. Report sent to referring provider as required.

## 2020-01-03 ENCOUNTER — CLINICAL SUPPORT (OUTPATIENT)
Dept: REHABILITATION | Facility: HOSPITAL | Age: 45
End: 2020-01-03
Payer: OTHER MISCELLANEOUS

## 2020-01-03 DIAGNOSIS — R26.9 GAIT DISTURBANCE: ICD-10-CM

## 2020-01-03 DIAGNOSIS — R29.898 RIGHT LEG WEAKNESS: ICD-10-CM

## 2020-01-03 DIAGNOSIS — M25.561 RIGHT KNEE PAIN, UNSPECIFIED CHRONICITY: ICD-10-CM

## 2020-01-03 DIAGNOSIS — R53.1 DECREASED STRENGTH: ICD-10-CM

## 2020-01-03 DIAGNOSIS — M25.60 LIMITED JOINT RANGE OF MOTION (ROM): ICD-10-CM

## 2020-01-03 PROCEDURE — 97140 MANUAL THERAPY 1/> REGIONS: CPT | Mod: PN

## 2020-01-03 NOTE — PROGRESS NOTES
"  Physical Therapy Daily Treatment Note     Name: Nilsa York  Clinic Number: [id number]    Therapy Diagnosis:   Encounter Diagnoses   Name Primary?    Right knee pain, unspecified chronicity     Right leg weakness     Gait disturbance     Limited joint range of motion (ROM)     Decreased strength      Physician: Sd Trujillo MD    Visit Date: 1/3/2020        Physician Orders: PT Eval and Treat   Medical Diagnosis from Referral: Rupture of ACL R knee, initial encounter.   Evaluation Date: 11/8/2019  Current Certification Period:  1/3/2020 to 2/14/2020  Authorization Period Expiration: 11/12/2020  Plan of Care Expiration: 2/14/2020  Visit # / Visits authorized: 4/ 12    Time In: 0900  Time Out: 1000  Total Billable Time: 30 minutes    Precautions: Recent R ACL repair    Subjective     Pt reports: "I've gone back to work full time but on light duty."  She was somewhat compliant with home exercise program.  Response to previous treatment: Increased soreness  Functional change: Returned to work     Pain: 8/10  Location: right knee      Objective     Recheck for POC update completed.     Nilsa received the following manual therapy techniques: Soft tissue Mobilization and Friction Massage were applied to the: R iliotibial band, medial head of the R gastroc and popliteus for 25 minutes, including:   Long stroking   Strumming   Deep sustained pressure   Therapy roller  Patient received kinesiotaping to R knee using I strip anchored with no tension R medial tibial plateau.  Tape laid down medial to lateral passing over infrapatellar tendon to distal attachment of lateral collateral ligament (J pattern) @ 15-25% tension then extended along LCL @ 75% tension to lateral femoral condyle continuing proximally @ 15-25% tension, terminating lateral mid thigh @ no tension.    Home Exercises Provided and Patient Education Provided     Education provided:   - Discussed current state and proposed updated POC    Written " Home Exercises Provided: Patient instructed to cont prior HEP.  Patient was instructed in self STM/friction massage.   Nilsa was able to verbalize process prior to the end of the session.  Nilsa demonstrated good  understanding of the education provided.     See EMR under Not this date.  for exercises provided prior visit.    Assessment     Patient presents to PT with residual deficits following ACL tear with subsequent repair.  See updated POC for details.    Nilsa is not progressing well towards her goals.  Patient has not been to therapy since 12/13/2019.    Pt prognosis is Fair.     Pt will continue to benefit from skilled outpatient physical therapy to address the deficits listed in the problem list box on initial evaluation, provide pt/family education and to maximize pt's level of independence in the home and community environment.     Pt's spiritual, cultural and educational needs considered and pt agreeable to plan of care and goals.     Anticipated barriers to physical therapy: None    Goals: See updated POC for details    Plan     Resume PT for ROM, strengthening, manual therapy, therapeutic taping.      Ebony Barone, PT

## 2020-01-03 NOTE — PLAN OF CARE
"  Outpatient Therapy Updated Plan of Care     Visit Date: 1/3/2020    Name: Nilsa York  Clinic Number: [id number]    Therapy Diagnosis:   Encounter Diagnoses   Name Primary?    Right knee pain, unspecified chronicity     Right leg weakness     Gait disturbance     Limited joint range of motion (ROM)     Decreased strength      Physician: Sd Trujillo MD    Physician Orders: PT Eval and Treat Focus on strengthening  Medical Diagnosis from Referral: Rupture of ACL R knee, initial encounter.   Evaluation Date: 11/8/2019  Current Certification Period:  1/3/2020 to 2/14/2020  Authorization Period Expiration:  11/12/2020  Plan of Care Expiration: 2/14/2020  Visit # / Visits authorized: 4/ 12    Precautions: ACL protocol  Functional Level Prior to Evaluation:  Unable to work, limited walking, unable to squat, kneel, is able to perform light housework and cooking but has not been able to return to full activity. Unable to participate in recreational activities.  Difficulty with tub transfers.  Interferes with sexual activity    Subjective     Update:   Pain:  Current 8/10 posterior R knee (sitting and keeping knee bent)   Best 3/10 Resting, laying in bed propped up with ice, ibuprofen   Worst 8/10  Described as aching and jumpy "like nerves", throbbing  Occurs when up on it a while, prolonged sitting with knee flexed.      "It still want to give out on me" mostly when turning (hyperextension)    Objective     Update:   Palpation: Minimal tenderness present over portal incisions.  Incisions are healed.  She is particularly tender over lateral collateral ligament and thickening at insertion of lateral head of R gastrocs is noted this date.    Sensation: Reports numbness around patella (medial and lateral aspect)  DTRs:  N/A  Range of Motion/Strength: B hips and ankles WNL, L knee WNL  R knee AROM 4*-114*   L knee 2*-130*  R knee PROM 3* - 124* with complaints pain from lateral joint line extending across " "inferior patellar tendon to insertion of medial gastroc head with resisted extension  L knee 4* - 145*    Strength:                     Right                Left              Hip flex             4+/5                5/5              Ext                   4+/5                 5/5              Abd/add           4+/5                 5/5              Knee flex         N/T                   5/5              Knee ext          N/T                   5/5              Ankle DF         5/5                    5/5    Reports "it feels like it wants to pop" resisted R hip flexion.  Also reports R knee pain ("the whole knee") with resisted knee extension  Flexibility: Hamstring length R 145*, L 170*; calf minimal tightness B.  Piriformis WNL, Quads R moderate tightness, L no tightenss, Hip flexors: minimal to moderate tightness on R, minimal tightness on R.   Gait: Without AD  Analysis: Decreased vinicio, minimally decreased R knee flexion for swing through, decreased weight shift to R.    Bed Mobility:Independent  Transfers: Independent  Increased UE support  Special Tests: Patellar mobility WNL, Swelling:   Girth:     Right   Left   @ joint line   33.6 cm  34.8 cm   @ 5 cm prox   41.0 cm  39.2 cm   @ 10 cm prox   44.2 cm  43.4 cm   @ 5 cm distal   33.3 cm  32.3 cm   @ 10 cm distal  36.1 cm  36.5 cm    Other: FOTO Knee survey indicates a 61% disability     Assessment     Update: Patient has made slow progress in PT with overall improvement in ROM and strength but she continues to report significant pain and demonstrates limited ROM and strength of R LE.  She also demonstrates increased tenderness and tissue thickening around R lateral knee.  She has returned to work on light duty but is reporting difficulty with increase knee pain due to prolonged sitting.  She should benefit from continued skilled PT service to address remaining deficits.    Previous Short Term Goals Status:       1) Decrease max pain to < 5/10 continued         "      2) Improve AROM to 0*-130* continued              3) Patient will demonstrate safe lifting technique for knee to thigh (not yet addressed)    New Short Term Goals Status:   Continue as above (patient only attended 2 sessions following previous POC update; therefore, insufficient time to address goals)    Long Term Goal Status:   modified:  1/3/2020   1) Pt will demonstrate normalized vinicio for walking, MET (11/8/2019)               2) Pt will demonstrate appropriate technique for mid squat to allow safe lifting mechanics,  Continued              3) Pt will improve standing/walking tolerance to > 2 hours for performance of work related activities, Continued              4) Improve FOTO disability rating to < 25%, Continued              5) Pt will be independent in HEP.   Continued              6) Pt will prepare to return to work.  Has returned to work on light duty Modified:  Patient will demonstrate ability to perform tasks consistent with usual job function.      Reasons for Recertification of Therapy:   Patient continues to report significant knee pain and demonstrate limitations in ROM, strength, and LE flexibility.  These problems contribute to impairment in functional mobility and ability to perform job tasks.      Plan     Updated Certification Period: 1/3/2020 to 2/14/2020  Recommended Treatment Plan: 2 times per week for 6 weeks: Electrical Stimulation IFC/NMES, Manual Therapy, Moist Heat/ Ice, Neuromuscular Re-ed, Patient Education, Therapeutic Activites, Therapeutic Exercise and Therapeutic taping as needed  Other Recommendations: Patient may benefit from dry needling to assist with soft tissue management and symptom resolution.      Ebony Barone, PT  1/3/2020      I CERTIFY THE NEED FOR THESE SERVICES FURNISHED UNDER THIS PLAN OF TREATMENT AND WHILE UNDER MY CARE    Physician's comments:        Physician's Signature: ___________________________________________________

## 2020-01-07 ENCOUNTER — CLINICAL SUPPORT (OUTPATIENT)
Dept: REHABILITATION | Facility: HOSPITAL | Age: 45
End: 2020-01-07
Payer: OTHER MISCELLANEOUS

## 2020-01-07 DIAGNOSIS — R26.9 GAIT DISTURBANCE: ICD-10-CM

## 2020-01-07 DIAGNOSIS — M25.60 LIMITED JOINT RANGE OF MOTION (ROM): ICD-10-CM

## 2020-01-07 DIAGNOSIS — R29.898 RIGHT LEG WEAKNESS: ICD-10-CM

## 2020-01-07 DIAGNOSIS — M25.561 RIGHT KNEE PAIN, UNSPECIFIED CHRONICITY: ICD-10-CM

## 2020-01-07 DIAGNOSIS — R53.1 DECREASED STRENGTH: ICD-10-CM

## 2020-01-07 PROCEDURE — 97110 THERAPEUTIC EXERCISES: CPT | Mod: PN,CQ

## 2020-01-07 PROCEDURE — 97140 MANUAL THERAPY 1/> REGIONS: CPT | Mod: PN,CQ

## 2020-01-07 NOTE — PROGRESS NOTES
"  Physical Therapy Daily Treatment Note     Name: Nilsa York  Clinic Number: [id number]    Therapy Diagnosis:   Encounter Diagnoses   Name Primary?    Right knee pain, unspecified chronicity     Right leg weakness     Gait disturbance     Limited joint range of motion (ROM)     Decreased strength      Physician: Sd Trujillo MD    Visit Date: 1/7/2020        Physician Orders: PT Eval and Treat   Medical Diagnosis from Referral: Rupture of ACL R knee, initial encounter.   Evaluation Date: 11/8/2019  Current Certification Period:  1/3/2020 to 2/14/2020  Authorization Period Expiration: 11/12/2020  Plan of Care Expiration: 2/14/2020  Visit # / Visits authorized: 4/ 12    Time In: 0807  Time Out: 0900  Total Billable Time: 53 minutes    Precautions: Recent R ACL repair    Subjective     Pt reports: "I' keep getting rowena horses in my right calf, I have been eating extra bananas."  She was compliant with home exercise program and instructions on scar tissue massage  Response to previous treatment: Soreness after manual  Functional change: Returned to work; lightduty    Pain: 6/10  Location: right knee      Objective   Nilsa received therapeutic exercises to develop strength, endurance, flexibility and stabilization of R knee for 40 minutes including:    Bike L4 x 10'    Standing gastroc stretches 3x30s ea    Mini squats 3x10    Heel/toe raises 3x10    Seated hamstring stretch 3x30s           Shuttle 62# mini squats and HR/TR x 30    Sports Art  knee flexion 33# x 30      Eccentric knee extension w/33# x 30                Nilsa received the following manual therapy techniques: Soft tissue Mobilization and Friction Massage were applied to the: R iliotibial band, medial head of the R gastroc and popliteus for 13 minutes, including:   Long stroking   Strumming   Deep sustained pressure   Therapy roller    Home Exercises Provided and Patient Education Provided     Education provided:   - Date and " time of next appt confirmed: 1/10/2020 at 9am      Written Home Exercises Provided: Patient instructed to cont prior HEP.  Patient was instructed in self STM/friction massage.   Nilsa was able to verbalize process prior to the end of the session.  Nilsa demonstrated good  understanding of the education provided.     See EMR under Not this date.  for exercises provided prior visit.    Assessment   Patient with decreased knots in R calf, increased tolerance with therex/MT. Fatigued from working overnight shift.     Pt prognosis is Fair.     Pt will continue to benefit from skilled outpatient physical therapy to address the deficits listed in the problem list box on initial evaluation, provide pt/family education and to maximize pt's level of independence in the home and community environment.     Pt's spiritual, cultural and educational needs considered and pt agreeable to plan of care and goals.     Anticipated barriers to physical therapy: None    Goals:   New Short Term Goals Status:    1) Decrease max pain to < 5/10 continued   2) Improve AROM to 0*-130* continued   3) Patient will demonstrate safe lifting technique for knee to thigh (not yet addressed)          Long Term Goal Status:   modified:  1/3/2020    1) Pt will demonstrate normalized vinicio for walking, MET (11/8/2019)     2) Pt will demonstrate appropriate technique for mid squat to allow safe lifting mechanics,  Continued    3) Pt will improve standing/walking tolerance to > 2 hours for performance of work related activities, Continued    4) Improve FOTO disability rating to < 25%, Continued    5) Pt will be independent in HEP.   Continued    6) Pt will prepare to return to work.  Has returned to work on light duty Modified:  Patient will demonstrate ability to perform tasks consistent with usual job function.        Plan     Resume PT for ROM, strengthening, manual therapy, therapeutic taping.      Nilsa Fung, PTA

## 2020-01-07 NOTE — PROGRESS NOTES
"  Physical Therapy Daily Treatment Note     Name: Nilsa York  Clinic Number: [id number]    Therapy Diagnosis:   Encounter Diagnoses   Name Primary?    Right knee pain, unspecified chronicity     Right leg weakness     Gait disturbance     Limited joint range of motion (ROM)     Decreased strength      Physician: Sd Trujillo MD    Visit Date: 1/3/2020        Physician Orders: PT Eval and Treat   Medical Diagnosis from Referral: Rupture of ACL R knee, initial encounter.   Evaluation Date: 11/8/2019  Current Certification Period:  1/3/2020 to 2/14/2020  Authorization Period Expiration: 11/12/2020  Plan of Care Expiration: 2/14/2020  Visit # / Visits authorized: 4/ 12    Time In: 0900  Time Out: 1000  Total Billable Time: 30 minutes    Precautions: Recent R ACL repair    Subjective     Pt reports: "I've gone back to work full time but on light duty."  She was somewhat compliant with home exercise program.  Response to previous treatment: Increased soreness  Functional change: Returned to work     Pain: 8/10  Location: right knee      Objective     Recheck for POC update completed.     Nilsa received the following manual therapy techniques: Soft tissue Mobilization and Friction Massage were applied to the: R iliotibial band, medial head of the R gastroc and popliteus for 25 minutes, including:   Long stroking   Strumming   Deep sustained pressure   Therapy roller  Patient received kinesiotaping to R knee using I strip anchored with no tension R medial tibial plateau.  Tape laid down medial to lateral passing over infrapatellar tendon to distal attachment of lateral collateral ligament (J pattern) @ 15-25% tension then extended along LCL @ 75% tension to lateral femoral condyle continuing proximally @ 15-25% tension, terminating lateral mid thigh @ no tension.    Home Exercises Provided and Patient Education Provided     Education provided:   - Discussed current state and proposed updated POC    Written " Home Exercises Provided: Patient instructed to cont prior HEP.  Patient was instructed in self STM/friction massage.   Nilsa was able to verbalize process prior to the end of the session.  Nilsa demonstrated good  understanding of the education provided.     See EMR under Not this date.  for exercises provided prior visit.    Assessment     Patient presents to PT with residual deficits following ACL tear with subsequent repair.  See updated POC for details.    Nilsa is not progressing well towards her goals.  Patient has not been to therapy since 12/13/2019.    Pt prognosis is Fair.     Pt will continue to benefit from skilled outpatient physical therapy to address the deficits listed in the problem list box on initial evaluation, provide pt/family education and to maximize pt's level of independence in the home and community environment.     Pt's spiritual, cultural and educational needs considered and pt agreeable to plan of care and goals.     Anticipated barriers to physical therapy: None    Goals: See updated POC for details    Plan     Resume PT for ROM, strengthening, manual therapy, therapeutic taping.      Ebony Barone, PT

## 2020-01-09 ENCOUNTER — PATIENT MESSAGE (OUTPATIENT)
Dept: ORTHOPEDICS | Facility: CLINIC | Age: 45
End: 2020-01-09

## 2020-01-10 ENCOUNTER — CLINICAL SUPPORT (OUTPATIENT)
Dept: REHABILITATION | Facility: HOSPITAL | Age: 45
End: 2020-01-10
Payer: OTHER MISCELLANEOUS

## 2020-01-10 ENCOUNTER — OFFICE VISIT (OUTPATIENT)
Dept: SPINE | Facility: CLINIC | Age: 45
End: 2020-01-10
Payer: OTHER MISCELLANEOUS

## 2020-01-10 VITALS
WEIGHT: 167.13 LBS | SYSTOLIC BLOOD PRESSURE: 155 MMHG | HEIGHT: 64 IN | BODY MASS INDEX: 28.53 KG/M2 | DIASTOLIC BLOOD PRESSURE: 92 MMHG | HEART RATE: 90 BPM

## 2020-01-10 DIAGNOSIS — M25.561 RIGHT KNEE PAIN, UNSPECIFIED CHRONICITY: ICD-10-CM

## 2020-01-10 DIAGNOSIS — R53.1 DECREASED STRENGTH: ICD-10-CM

## 2020-01-10 DIAGNOSIS — M25.60 LIMITED JOINT RANGE OF MOTION (ROM): ICD-10-CM

## 2020-01-10 DIAGNOSIS — R26.9 GAIT DISTURBANCE: ICD-10-CM

## 2020-01-10 DIAGNOSIS — R29.898 RIGHT LEG WEAKNESS: ICD-10-CM

## 2020-01-10 DIAGNOSIS — M54.16 RIGHT LUMBAR RADICULITIS: Primary | ICD-10-CM

## 2020-01-10 PROCEDURE — 99204 OFFICE O/P NEW MOD 45 MIN: CPT | Mod: S$GLB,,, | Performed by: PHYSICAL MEDICINE & REHABILITATION

## 2020-01-10 PROCEDURE — 97110 THERAPEUTIC EXERCISES: CPT | Mod: PN,CQ

## 2020-01-10 PROCEDURE — 99204 PR OFFICE/OUTPT VISIT, NEW, LEVL IV, 45-59 MIN: ICD-10-PCS | Mod: S$GLB,,, | Performed by: PHYSICAL MEDICINE & REHABILITATION

## 2020-01-10 NOTE — PROGRESS NOTES
"  Physical Therapy Daily Treatment Note     Name: Nilsa York  Clinic Number: [id number]    Therapy Diagnosis:   Encounter Diagnoses   Name Primary?    Right knee pain, unspecified chronicity     Right leg weakness     Gait disturbance     Limited joint range of motion (ROM)     Decreased strength      Physician: Sd Trujillo MD    Visit Date: 1/10/2020    Physician Orders: PT Eval and Treat   Medical Diagnosis from Referral: Rupture of ACL R knee, initial encounter.   Evaluation Date: 11/8/2019  Current Certification Period:  1/3/2020 to 2/14/2020  Visit # / Visits authorized: 7/ 12    Time In: 0900  Time Out: 0955  Total Billable Time: 55 minutes    Precautions: ACL protocol    Subjective     Pt reports: pain and a "knot" posterior knee.  Pt stated she would like to try dry needling to help with her pain.  She was compliant with home exercise program.  Response to previous treatment: no complaints  Functional change: n/a    Pain: 6/10  Location: right knee      Objective     Nilsa received therapeutic exercises to develop strength, endurance, ROM and flexibility for 55 minutes including:    Bike x 10 min L-4  HSS 3 x 30 sec  GSS 3 x 30 sec  HR/TR airex x 30 reps  Mini squats airex x 30 reps  SLR 4-way 3# x 30 reps R LE  Prone ham curls 3# x 30 reps R LE  Shuttle B LE and heel raises x 30 reps  DF-100 flex 33# x 30 reps; ext 22# x 30 reps    Home Exercises Provided and Patient Education Provided     Education provided:   Educated pt on applying ice if delayed muscle soreness occurs.  Discussed dry needling with patient per POC to help with pain posterior/medial knee.    Written Home Exercises Provided: Patient instructed to cont prior HEP.  Exercises were reviewed and Nilsa was able to demonstrate them prior to the end of the session.  Nilsa demonstrated good  understanding of the education provided.     See EMR under Patient Instructions for exercises provided prior visit.    Assessment " "    Pt reports "feelign a "knot" in her posterior knee with HSS  Nilsa is progressing well towards her goals.   Pt prognosis is Good.     Pt will continue to benefit from skilled outpatient physical therapy to address the deficits listed in the problem list box on initial evaluation, provide pt/family education and to maximize pt's level of independence in the home and community environment.     Pt's spiritual, cultural and educational needs considered and pt agreeable to plan of care and goals.     Anticipated barriers to physical therapy: none known    Goals: per Ebony Barone, PT  New Short Term Goals Status:    1) Decrease max pain to < 5/10 continued   2) Improve AROM to 0*-130* continued   3) Patient will demonstrate safe lifting technique for knee to thigh (not yet addressed)        Long Term Goal Status:   modified:  1/3/2020    1) Pt will demonstrate normalized vinicio for walking, MET (11/8/2019)     2) Pt will demonstrate appropriate technique for mid squat to allow safe lifting mechanics,  Continued    3) Pt will improve standing/walking tolerance to > 2 hours for performance of work related activities, Continued    4) Improve FOTO disability rating to < 25%, Continued    5) Pt will be independent in HEP.   Continued    6) Pt will prepare to return to work.  Has returned to work on light duty Modified:  Patient will demonstrate ability to perform tasks consistent with usual job function    Plan     Pt scheduled with Philip Morrison PT next visit for dry needling.    Cont with there ex, stretching/strengthening, and modalities to decrease pain, improve flexibility and functional mobility per POC.    Roz Ricketts, PTA   "

## 2020-01-10 NOTE — LETTER
January 10, 2020      Sd Trujillo MD  [address]  [address]  [place] [address]           [place] [facility]  [address]  [place] [address]  Phone: [phone]  Fax: [phone]          Patient: Nilsa York   MR Number: [id number]   YOB: 1975   Date of Visit: 1/10/2020       Dear Dr. Sd Trujillo:    Thank you for referring Nilsa York to me for evaluation. Attached you will find relevant portions of my assessment and plan of care.    If you have questions, please do not hesitate to call me. I look forward to following Nilsa York along with you.    Sincerely,    Han Drake MD    Enclosure  CC:  No Recipients    If you would like to receive this communication electronically, please contact [email or web address] or [phone] to request more information on [facility] access.    For providers and/or their staff who would like to refer a patient to [facility], please contact us through our one-stop-shop provider referral line, [facility], at [phone].    If you feel you have received this communication in error or would no longer like to receive these types of communications, please e-mail [email or web address]

## 2020-01-10 NOTE — PROGRESS NOTES
SUBJECTIVE:    Patient ID: Nilsa York is a 44 y.o. female.    Chief Complaint: Back Pain (down right leg)    This is a 44-year-old woman who sees  for her primary care. Other than hypertension she has no chronic major medical problems.  No history of back problems.  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 Trujillo.  Patient tells me that at that time she also had right-sided low back pain that was radiating down the right leg to the knee.  Traveling on the lateral portion of the leg.  Not extending below the knee and not associated with any numbness or tingling.  She says she was so focused on her knee that she did not talk about her back and upper leg pain at that time.  She mentioned it to Dr. Trujillo on her 12/27/2019 office visit.  She denies bowel or bladder dysfunction fever chills sweats or unexpected weight loss.  Current pain level is 8/10.  She has been in physical therapy for her knee but they have not addressed her back pain.  No imaging has been done            Past Medical History:   Diagnosis Date    Encounter for blood transfusion     Hypertension      Social History     Socioeconomic History    Marital status:      Spouse name: Not on file    Number of children: Not on file    Years of education: Not on file    Highest education level: Not on file   Occupational History    Not on file   Social Needs    Financial resource strain: Not on file    Food insecurity:     Worry: Not on file     Inability: Not on file    Transportation needs:     Medical: Not on file     Non-medical: Not on file   Tobacco Use    Smoking status: Never Smoker    Smokeless tobacco: Never Used   Substance and Sexual Activity    Alcohol use: No    Drug use: No    Sexual activity: Yes     Partners: Male   Lifestyle    Physical activity:     Days per week:  "Not on file     Minutes per session: Not on file    Stress: Not on file   Relationships    Social connections:     Talks on phone: Not on file     Gets together: Not on file     Attends Che[employer] service: Not on file     Active member of club or organization: Not on file     Attends meetings of clubs or organizations: Not on file     Relationship status: Not on file   Other Topics Concern    Not on file   Social History Narrative    Not on file     Past Surgical History:   Procedure Laterality Date    CHOLECYSTECTOMY      Gastric sleeve      KNEE ARTHROSCOPY W/ ACL RECONSTRUCTION Right 8/22/2019    Procedure: RECONSTRUCTION, KNEE, ACL, ARTHROSCOPIC;  Surgeon: Sd Trujillo MD;  Location: [facility];  Service: Orthopedics;  Laterality: Right;  anesthesia:  general and block    TUBAL LIGATION       History reviewed. No pertinent family history.  Vitals:    01/10/20 1118   BP: (!) 155/92   Pulse: 90   Weight: 75.8 kg (167 lb 1.7 oz)   Height: 5' 4" (1.626 m)       Review of Systems   Constitutional: Negative for chills, diaphoresis, fatigue, fever and unexpected weight change.   HENT: Negative for trouble swallowing.    Eyes: Negative for visual disturbance.   Respiratory: Negative for shortness of breath.    Cardiovascular: Negative for chest pain.   Gastrointestinal: Negative for abdominal pain, constipation, nausea and vomiting.   Genitourinary: Negative for difficulty urinating.   Musculoskeletal: Negative for arthralgias, back pain, gait problem, joint swelling, myalgias, neck pain and neck stiffness.   Neurological: Negative for dizziness, speech difficulty, weakness, light-headedness, numbness and headaches.          Objective:      Physical Exam   Constitutional: She is oriented to person, place, and time. She appears well-developed and well-nourished.   Neurological: She is alert and oriented to person, place, and time.   She is awake and in no acute distress  No point tenderness or palpable masses " about the lumbar spine  Forward flexion and extension are normal with only mild discomfort  Deep tendon reflexes +2 at both knees and +1 at the ankles  Strength is normal in both lower extremities  Straight leg raising negative bilaterally           Assessment:       1. Right lumbar radiculitis           Plan:     she has a nonfocal examination from a neurological standpoint and no historical red flags.  She is experiencing low back and right leg pain.  Possible radicular component.  No evidence of nerve root dysfunction.  We will get an MRI of the lumbar spine.  I will follow-up with her after      Right lumbar radiculitis  -     MRI Lumbar Spine Without Contrast; Future; Expected date: 01/10/2020

## 2020-01-11 ENCOUNTER — PATIENT MESSAGE (OUTPATIENT)
Dept: REHABILITATION | Facility: HOSPITAL | Age: 45
End: 2020-01-11

## 2020-01-15 ENCOUNTER — CLINICAL SUPPORT (OUTPATIENT)
Dept: REHABILITATION | Facility: HOSPITAL | Age: 45
End: 2020-01-15
Payer: OTHER MISCELLANEOUS

## 2020-01-15 DIAGNOSIS — R26.9 GAIT ABNORMALITY: Primary | ICD-10-CM

## 2020-01-15 PROCEDURE — 97140 MANUAL THERAPY 1/> REGIONS: CPT | Mod: PN

## 2020-01-15 PROCEDURE — 97110 THERAPEUTIC EXERCISES: CPT | Mod: PN

## 2020-01-15 PROCEDURE — 97014 ELECTRIC STIMULATION THERAPY: CPT | Mod: PN

## 2020-01-15 NOTE — PROGRESS NOTES
TIME RECORD    Date:  01/15/2020    Start Time:  0850  Stop Time:  0935    PROCEDURES:    TIMED  Procedure Time Min.   TE Start:0850  Stop:0902 12   MTT/DN Start:0905  Stop:0935 30    Start:  Stop:     Start:  Stop:          UNTIMED  Procedure Time Min.   E STIM/IMS  Start:0920  Stop:0935 15    Start:  Stop:      Total Timed Minutes:  42  Total Timed Units:  3  Total Untimed Units:  1  Charges Billed/# of units:  4      Progress/Current Status    Subjective:     Patient ID: Nilsa York is a 44 y.o. female.  Diagnosis: No diagnosis found.  Pain: 8 /10  Pt wants to try dry needling.    Objective:   Consent form for DN was signed by pt.  Bike x 12' f/b dry needling to rt quad,rectus,VM,VL with IMS X 15'.tib ant.HAs 4,5,18,24.    Assessment:     Tolerated DN very well.    Patient Education/Response:     Expect to be sore tonight.    Plans and Goals:     Cont per POC.

## 2020-01-17 ENCOUNTER — CLINICAL SUPPORT (OUTPATIENT)
Dept: REHABILITATION | Facility: HOSPITAL | Age: 45
End: 2020-01-17
Payer: OTHER MISCELLANEOUS

## 2020-01-17 DIAGNOSIS — R53.1 DECREASED STRENGTH: ICD-10-CM

## 2020-01-17 DIAGNOSIS — M25.60 LIMITED JOINT RANGE OF MOTION (ROM): ICD-10-CM

## 2020-01-17 DIAGNOSIS — R29.898 RIGHT LEG WEAKNESS: ICD-10-CM

## 2020-01-17 DIAGNOSIS — R26.9 GAIT DISTURBANCE: ICD-10-CM

## 2020-01-17 DIAGNOSIS — M25.561 RIGHT KNEE PAIN, UNSPECIFIED CHRONICITY: ICD-10-CM

## 2020-01-17 PROCEDURE — 97140 MANUAL THERAPY 1/> REGIONS: CPT | Mod: PN

## 2020-01-17 PROCEDURE — 97110 THERAPEUTIC EXERCISES: CPT | Mod: PN

## 2020-01-17 NOTE — PROGRESS NOTES
"  Physical Therapy Daily Treatment Note     Name: Nilsa York  Clinic Number: [id number]    Therapy Diagnosis:   Encounter Diagnoses   Name Primary?    Right knee pain, unspecified chronicity     Right leg weakness     Gait disturbance     Limited joint range of motion (ROM)     Decreased strength      Physician: Sd Trujillo MD    Visit Date: 1/17/2020    Physician Orders: PT Eval and Treat   Medical Diagnosis from Referral: Rupture of ACL R knee, initial encounter.   Evaluation Date: 11/8/2019  Current Certification Period:  1/3/2020 to 2/14/2020  Visit # / Visits authorized: 9/ 12     Time In: 0801  Time Out: 0902  Total Billable Time: 55 minutes    Precautions: ACL protocol    Subjective     Pt reports: "I'm still pretty sore from the last session".  She was compliant with home exercise program.  Response to previous treatment: Increased soreness  Functional change: Minimally decreased walking/standing tolerance due to soreness    Pain: 6/10  Location: right knee      Objective     Nilsa received therapeutic exercises to develop strength and flexibility for 45 minutes including:   Bike L4 x 10'   Standing gastroc stretch 3x30s   Heel/toe raise on airex x 30   Mini squat on airex x 30   Closed kinetic chain TKE on 2" step x 30   Shuttle B leg press w/62# x 30    Heel raises w/62# x 30    knee flexion w/ 33# x 30    Extension w/22# x 30   Seated hamstring stretch 3x30s R   4-way SLR using 3# weight x 30 ea   Prone knee flexion w/3# weight x 30    Nilsa received the following manual therapy techniques: Soft tissue Mobilization were applied to the: posterior R knee for 1  minutes, including:   Strumming, long stroking, therapy roller    Home Exercises Provided and Patient Education Provided     Education provided:   - Review    Written Home Exercises Provided: Patient instructed to cont prior HEP.  Exercises were reviewed and Nilsa was able to demonstrate them prior to the end of the " session.  Nilsa demonstrated good  understanding of the education provided.     See EMR under Patient Instructions for exercises provided prior visit.    Assessment     Patient presents with continued R knee pain with significant tenderness and soft tissue restriction in popliteal area including distal hamstrings and proximal gastrocs.  Soft tissue restrictions noted throughout same area.  Patient continues to struggle with knee pain.    Nilsa is progressing slowly towards her goals.   Pt prognosis is Good.     Pt will continue to benefit from skilled outpatient physical therapy to address the deficits listed in the problem list box on initial evaluation, provide pt/family education and to maximize pt's level of independence in the home and community environment.     Pt's spiritual, cultural and educational needs considered and pt agreeable to plan of care and goals.     Anticipated barriers to physical therapy: None known    Goals:   Short Term Goals:   1) Decrease max pain to < 5/10 continued   2) Improve AROM to 0*-130* Progressing   3) Patient will demonstrate safe lifting technique for knee to thigh (unable to address due to knee pain)        Long Term Goal Status:   modified:  1/3/2020    1) Pt will demonstrate normalized vinicio for walking, MET (11/8/2019)     2) Pt will demonstrate appropriate technique for mid squat to allow safe lifting mechanics,  Progressing    3) Pt will improve standing/walking tolerance to > 2 hours for performance of work related activities, Continued    4) Improve FOTO disability rating to < 25%, Continued    5) Pt will be independent in HEP.   Continued    6) Pt will prepare to return to work.  Has returned to work on light duty Modified:  Patient will demonstrate ability to perform tasks consistent with usual job function Not yet met     Plan     Progress ROM and strengthening activities as patient tolerates.  Knee stabilization activities.      Ebony Barone, PT

## 2020-01-21 ENCOUNTER — CLINICAL SUPPORT (OUTPATIENT)
Dept: REHABILITATION | Facility: HOSPITAL | Age: 45
End: 2020-01-21
Payer: OTHER MISCELLANEOUS

## 2020-01-21 DIAGNOSIS — M25.561 RIGHT KNEE PAIN, UNSPECIFIED CHRONICITY: ICD-10-CM

## 2020-01-21 DIAGNOSIS — R29.898 RIGHT LEG WEAKNESS: ICD-10-CM

## 2020-01-21 DIAGNOSIS — R26.9 GAIT DISTURBANCE: ICD-10-CM

## 2020-01-21 DIAGNOSIS — R53.1 DECREASED STRENGTH: ICD-10-CM

## 2020-01-21 DIAGNOSIS — M25.60 LIMITED JOINT RANGE OF MOTION (ROM): ICD-10-CM

## 2020-01-21 PROCEDURE — 97110 THERAPEUTIC EXERCISES: CPT | Mod: PN,CQ

## 2020-01-21 NOTE — PROGRESS NOTES
"  Physical Therapy Daily Treatment Note     Name: Nilsa York  Clinic Number: [id number]    Therapy Diagnosis:   Encounter Diagnoses   Name Primary?    Right knee pain, unspecified chronicity     Right leg weakness     Gait disturbance     Limited joint range of motion (ROM)     Decreased strength      Physician: Sd Trujillo MD    Visit Date: 1/21/2020    Physician Orders: PT Eval and Treat   Medical Diagnosis from Referral: Rupture of ACL R knee, initial encounter.   Evaluation Date: 11/8/2019  Current Certification Period:  1/3/2020 to 2/14/2020  Visit # / Visits authorized: 9/ 12     Time In: 0840  Time Out: 0930  Total Billable Time: 50 minutes    Precautions: ACL protocol    Subjective     Pt reports: "Didn't bother me too much over the weekend"  She was compliant with home exercise program.  Response to previous treatment:No change  Functional change: Minimally decreased walking/standing tolerance due to soreness    Pain: 6/10  Location: right knee      Objective     Nilsa received therapeutic exercises to develop strength and flexibility for 50 minutes including:  Bike L4 x 10'  Standing gastroc stretch 3x30s  Heel/toe raise on airex x 30  Mini squat on airex x 30  Closed kinetic chain TKE on 2" step x 30  Shuttle B leg press w/62# x 30   Heel raises w/62# x 30   knee flexion w/ 33# x 30   Extension w/33# x 30  Seated hamstring stretch 3x30s R  4-way SLR using 3# weight x 30 R, and L as well to increase stability in R knee  Standing hamstring curl w/3# weight x 30        Home Exercises Provided and Patient Education Provided     Education provided:   - Review    Written Home Exercises Provided: Patient instructed to cont prior HEP.  Exercises were reviewed and Nilsa was able to demonstrate them prior to the end of the session.  Nilsa demonstrated good  understanding of the education provided.     See EMR under Patient Instructions for exercises provided prior visit.    Assessment "   Patient is progressing with increased tolerance for social activities as well as work duties, remains with difficulty with sweeping and mopping.  Patients strength in B hips is weak decreasing stability in B LE's    Nilsa is progressing slowly towards her goals.   Pt prognosis is Good.     Pt will continue to benefit from skilled outpatient physical therapy to address the deficits listed in the problem list box on initial evaluation, provide pt/family education and to maximize pt's level of independence in the home and community environment.     Pt's spiritual, cultural and educational needs considered and pt agreeable to plan of care and goals.     Anticipated barriers to physical therapy: None known    Goals:   Short Term Goals:   1) Decrease max pain to < 5/10 (continued)   2) Improve AROM to 0*-130* (Progressing)   3) Patient will demonstrate safe lifting technique for knee to thigh (unable to address due to knee pain)        Long Term Goal Status:   modified:  1/3/2020    1) Pt will demonstrate normalized vinicio for walking, MET (11/8/2019)     2) Pt will demonstrate appropriate technique for mid squat to allow safe lifting mechanics,  (Progressing)    3) Pt will improve standing/walking tolerance to > 2 hours for performance of work related activities, (Continued)    4) Improve FOTO disability rating to < 25%, (Not assessed this visit)    5) Pt will be independent in HEP.   (Continued)    6) Pt will prepare to return to work.  Has returned to work on light duty Modified:  Patient will demonstrate ability to perform tasks consistent with usual job function (Not yet met)     Plan     Progress ROM and strengthening activities as patient tolerates.  Knee/hip stabilization activities.      Nilsa Fung, PTA

## 2020-03-03 ENCOUNTER — OFFICE VISIT (OUTPATIENT)
Dept: ORTHOPEDICS | Facility: CLINIC | Age: 45
End: 2020-03-03
Payer: OTHER MISCELLANEOUS

## 2020-03-03 VITALS
SYSTOLIC BLOOD PRESSURE: 158 MMHG | DIASTOLIC BLOOD PRESSURE: 79 MMHG | HEART RATE: 73 BPM | BODY MASS INDEX: 28.51 KG/M2 | HEIGHT: 64 IN | WEIGHT: 167 LBS

## 2020-03-03 DIAGNOSIS — Z98.890 S/P ACL RECONSTRUCTION: Primary | ICD-10-CM

## 2020-03-03 PROCEDURE — 99213 OFFICE O/P EST LOW 20 MIN: CPT | Mod: S$GLB,,, | Performed by: ORTHOPAEDIC SURGERY

## 2020-03-03 PROCEDURE — 99213 PR OFFICE/OUTPT VISIT, EST, LEVL III, 20-29 MIN: ICD-10-PCS | Mod: S$GLB,,, | Performed by: ORTHOPAEDIC SURGERY

## 2020-03-03 PROCEDURE — 99999 PR PBB SHADOW E&M-EST. PATIENT-LVL III: CPT | Mod: PBBFAC,,, | Performed by: ORTHOPAEDIC SURGERY

## 2020-03-03 PROCEDURE — 99999 PR PBB SHADOW E&M-EST. PATIENT-LVL III: ICD-10-PCS | Mod: PBBFAC,,, | Performed by: ORTHOPAEDIC SURGERY

## 2020-03-03 RX ORDER — RIZATRIPTAN BENZOATE 10 MG/1
TABLET, ORALLY DISINTEGRATING ORAL
COMMUNITY
Start: 2020-01-12

## 2020-03-03 RX ORDER — TRAMADOL HYDROCHLORIDE 50 MG/1
50 TABLET ORAL EVERY 6 HOURS PRN
Qty: 30 TABLET | Refills: 0 | Status: SHIPPED | OUTPATIENT
Start: 2020-03-03

## 2020-03-03 RX ORDER — VENLAFAXINE HYDROCHLORIDE 75 MG/1
CAPSULE, EXTENDED RELEASE ORAL
COMMUNITY
Start: 2020-01-12

## 2020-03-03 RX ORDER — FLUTICASONE PROPIONATE 50 MCG
2 SPRAY, SUSPENSION (ML) NASAL
COMMUNITY
Start: 2020-01-15 | End: 2021-01-14

## 2020-03-03 RX ORDER — MIRTAZAPINE 15 MG/1
TABLET, FILM COATED ORAL
COMMUNITY
Start: 2020-01-12

## 2020-03-03 RX ORDER — ERGOCALCIFEROL 1.25 MG/1
50000 CAPSULE ORAL
COMMUNITY
Start: 2020-01-15

## 2020-03-03 RX ORDER — DICLOFENAC SODIUM 10 MG/G
2 GEL TOPICAL 4 TIMES DAILY
Qty: 100 G | Refills: 0 | Status: SHIPPED | OUTPATIENT
Start: 2020-03-03 | End: 2020-09-21 | Stop reason: SDUPTHER

## 2020-03-04 NOTE — PROGRESS NOTES
Past Medical History:   Diagnosis Date    Encounter for blood transfusion     Hypertension        Past Surgical History:   Procedure Laterality Date    CHOLECYSTECTOMY      Gastric sleeve      KNEE ARTHROSCOPY W/ ACL RECONSTRUCTION Right 8/22/2019    Procedure: RECONSTRUCTION, KNEE, ACL, ARTHROSCOPIC;  Surgeon: Sd Trujillo MD;  Location: [facility];  Service: Orthopedics;  Laterality: Right;  anesthesia:  general and block    TUBAL LIGATION         Current Outpatient Medications   Medication Sig    acetaminophen (TYLENOL) 650 MG TbSR Take 650 mg by mouth every 8 (eight) hours.    ergocalciferol (ERGOCALCIFEROL) 50,000 unit Cap Take 50,000 Units by mouth.    fluticasone propionate (FLONASE) 50 mcg/actuation nasal spray 2 sprays by Nasal route.    ibuprofen (ADVIL,MOTRIN) 800 MG tablet Take 1 tablet (800 mg total) by mouth 3 (three) times daily.    lisinopril (PRINIVIL,ZESTRIL) 40 MG tablet Take 40 mg by mouth once daily.    mirtazapine (REMERON) 15 MG tablet TAKE 1 2 TO 1 (ONE HALF TO ONE) TABLET BY MOUTH ONCE DAILY AT NIGHT AS NEEDED    oxyCODONE-acetaminophen (PERCOCET)  mg per tablet Take 1 tablet by mouth every 6 (six) hours as needed for Pain. Quantity medically necessary    rizatriptan (MAXALT-MLT) 10 MG disintegrating tablet TAKE 1 TABLET BY MOUTH AS NEEDED FOR MIGRAINE. MAY REPEAT IN 2 HOURS IF NEEDED. MAX OF 30 MG 24HOURS    topiramate (TOPAMAX) 100 MG tablet Take 200 mg by mouth 2 (two) times daily.    venlafaxine (EFFEXOR-XR) 150 MG Cp24 Take 225 mg by mouth once daily.     venlafaxine (EFFEXOR-XR) 75 MG 24 hr capsule TAKE 1 CAPSULE BY MOUTH ONCE DAILY IN THE MORNING WITH EFFEXOR 150MG TO 225MG DOSE    diclofenac sodium (VOLTAREN) 1 % Gel Apply 2 g topically 4 (four) times daily.    traMADol (ULTRAM) 50 mg tablet Take 1 tablet (50 mg total) by mouth every 6 (six) hours as needed for Pain.     No current facility-administered medications for this visit.      Facility-Administered  Medications Ordered in Other Visits   Medication    lactated ringers infusion    sodium chloride 0.9% flush 3 mL       Review of patient's allergies indicates:  No Known Allergies    History reviewed. No pertinent family history.    Social History     Socioeconomic History    Marital status: Single     Spouse name: Not on file    Number of children: Not on file    Years of education: Not on file    Highest education level: Not on file   Occupational History    Not on file   Social Needs    Financial resource strain: Not on file    Food insecurity:     Worry: Not on file     Inability: Not on file    Transportation needs:     Medical: Not on file     Non-medical: Not on file   Tobacco Use    Smoking status: Never Smoker    Smokeless tobacco: Never Used   Substance and Sexual Activity    Alcohol use: No    Drug use: No    Sexual activity: Yes     Partners: Male   Lifestyle    Physical activity:     Days per week: Not on file     Minutes per session: Not on file    Stress: Not on file   Relationships    Social connections:     Talks on phone: Not on file     Gets together: Not on file     Attends [employer] service: Not on file     Active member of club or organization: Not on file     Attends meetings of clubs or organizations: Not on file     Relationship status: Not on file   Other Topics Concern    Not on file   Social History Narrative    Not on file       Chief Complaint:   Chief Complaint   Patient presents with    Right Knee - Pain       Consulting Physician: Sd Trujillo MD    History of present illness: This is a 45 y.o. female following up for right ACL reconstruction on 08/22/2019.      Review of Systems:    Constitution: Denies chills, fever, and sweats.    HENT: Denies headaches or blurry vision.    Cardiovascular: Denies chest pain or irregular heart beat.    Respiratory: Denies cough or shortness of breath.    Gastrointestinal: Denies abdominal pain, nausea, or  vomiting.    Musculoskeletal:  Denies muscle cramps.    Neurological: Denies dizziness or focal weakness.    Psychiatric/Behavioral: Normal mental status.    Hematologic/Lymphatic: Denies bleeding problem or easy bruising/bleeding.    Skin: Denies rash or suspicious lesions.      Examination:    Vital Signs:    Vitals:    03/03/20 1309   BP: (!) 158/79   Pulse: 73       Body mass index is 28.67 kg/m².    This a well-developed, well nourished patient in no acute distress.    Alert and oriented and cooperative to examination.       Physical Exam:  Right knee    Inspection/Observation   Swelling:   None  Erythema:   none  Bruising:   none  Wounds:   Healed  Drainage:  None    Range of Motion   Full    Muscle Strength   4+5/5    Other   Sensation:  normal  Pulse:    palpable    Imaging:      Assessment: S/P ACL reconstruction        Plan:  She has not been in PT for several weeks.  Her pain is up to 7/10.  She is still reporting episodes of the knee giving way.  We will continue therapy here with an emphasis on strength..  Back in 6 weeks.  She is released for light duty only. Voltaren and ultram RX. Brace helps.    DISCLAIMER: This note may have been dictated using voice recognition software and may contain grammatical errors. Report sent to referring provider as required.

## 2020-04-15 ENCOUNTER — TELEPHONE (OUTPATIENT)
Dept: ORTHOPEDICS | Facility: CLINIC | Age: 45
End: 2020-04-15

## 2020-04-15 NOTE — TELEPHONE ENCOUNTER
----- Message from Elen Devine MA sent at 4/15/2020  8:02 AM CDT -----  Contact: maria antonia [facility] has accepted virtual visits,   Call back

## 2020-05-22 ENCOUNTER — OFFICE VISIT (OUTPATIENT)
Dept: ORTHOPEDICS | Facility: CLINIC | Age: 45
End: 2020-05-22
Payer: OTHER MISCELLANEOUS

## 2020-05-22 VITALS — BODY MASS INDEX: 28.51 KG/M2 | RESPIRATION RATE: 18 BRPM | TEMPERATURE: 99 F | WEIGHT: 167 LBS | HEIGHT: 64 IN

## 2020-05-22 DIAGNOSIS — M54.16 RIGHT LUMBAR RADICULITIS: Primary | ICD-10-CM

## 2020-05-22 DIAGNOSIS — Z98.890 S/P ACL RECONSTRUCTION: Primary | ICD-10-CM

## 2020-05-22 DIAGNOSIS — G89.29 CHRONIC PAIN OF RIGHT KNEE: ICD-10-CM

## 2020-05-22 DIAGNOSIS — M25.561 CHRONIC PAIN OF RIGHT KNEE: ICD-10-CM

## 2020-05-22 PROCEDURE — 99213 PR OFFICE/OUTPT VISIT, EST, LEVL III, 20-29 MIN: ICD-10-PCS | Mod: S$GLB,,, | Performed by: ORTHOPAEDIC SURGERY

## 2020-05-22 PROCEDURE — 99213 OFFICE O/P EST LOW 20 MIN: CPT | Mod: S$GLB,,, | Performed by: ORTHOPAEDIC SURGERY

## 2020-05-22 PROCEDURE — 99999 PR PBB SHADOW E&M-EST. PATIENT-LVL III: CPT | Mod: PBBFAC,,, | Performed by: ORTHOPAEDIC SURGERY

## 2020-05-22 PROCEDURE — 99999 PR PBB SHADOW E&M-EST. PATIENT-LVL III: ICD-10-PCS | Mod: PBBFAC,,, | Performed by: ORTHOPAEDIC SURGERY

## 2020-05-22 RX ORDER — DEXTROAMPHETAMINE SACCHARATE, AMPHETAMINE ASPARTATE, DEXTROAMPHETAMINE SULFATE AND AMPHETAMINE SULFATE 7.5; 7.5; 7.5; 7.5 MG/1; MG/1; MG/1; MG/1
30 TABLET ORAL
COMMUNITY

## 2020-05-22 NOTE — PROGRESS NOTES
Past Medical History:   Diagnosis Date    Encounter for blood transfusion     Hypertension        Past Surgical History:   Procedure Laterality Date    CHOLECYSTECTOMY      Gastric sleeve      KNEE ARTHROSCOPY W/ ACL RECONSTRUCTION Right 8/22/2019    Procedure: RECONSTRUCTION, KNEE, ACL, ARTHROSCOPIC;  Surgeon: Sd Trujillo MD;  Location: [facility];  Service: Orthopedics;  Laterality: Right;  anesthesia:  general and block    TUBAL LIGATION         Current Outpatient Medications   Medication Sig    acetaminophen (TYLENOL) 650 MG TbSR Take 650 mg by mouth every 8 (eight) hours.    diclofenac sodium (VOLTAREN) 1 % Gel Apply 2 g topically 4 (four) times daily.    ergocalciferol (ERGOCALCIFEROL) 50,000 unit Cap Take 50,000 Units by mouth.    fluticasone propionate (FLONASE) 50 mcg/actuation nasal spray 2 sprays by Nasal route.    ibuprofen (ADVIL,MOTRIN) 800 MG tablet Take 1 tablet (800 mg total) by mouth 3 (three) times daily.    lisinopril (PRINIVIL,ZESTRIL) 40 MG tablet Take 40 mg by mouth once daily.    mirtazapine (REMERON) 15 MG tablet TAKE 1 2 TO 1 (ONE HALF TO ONE) TABLET BY MOUTH ONCE DAILY AT NIGHT AS NEEDED    oxyCODONE-acetaminophen (PERCOCET)  mg per tablet Take 1 tablet by mouth every 6 (six) hours as needed for Pain. Quantity medically necessary    rizatriptan (MAXALT-MLT) 10 MG disintegrating tablet TAKE 1 TABLET BY MOUTH AS NEEDED FOR MIGRAINE. MAY REPEAT IN 2 HOURS IF NEEDED. MAX OF 30 MG 24HOURS    topiramate (TOPAMAX) 100 MG tablet Take 200 mg by mouth 2 (two) times daily.    traMADol (ULTRAM) 50 mg tablet Take 1 tablet (50 mg total) by mouth every 6 (six) hours as needed for Pain.    venlafaxine (EFFEXOR-XR) 150 MG Cp24 Take 225 mg by mouth once daily.     venlafaxine (EFFEXOR-XR) 75 MG 24 hr capsule TAKE 1 CAPSULE BY MOUTH ONCE DAILY IN THE MORNING WITH EFFEXOR 150MG TO 225MG DOSE    dextroamphetamine-amphetamine 30 mg Tab Take 30 mg by mouth.     No current  facility-administered medications for this visit.      Facility-Administered Medications Ordered in Other Visits   Medication    lactated ringers infusion    sodium chloride 0.9% flush 3 mL       Review of patient's allergies indicates:  No Known Allergies    History reviewed. No pertinent family history.    Social History     Socioeconomic History    Marital status: Single     Spouse name: Not on file    Number of children: Not on file    Years of education: Not on file    Highest education level: Not on file   Occupational History    Not on file   Social Needs    Financial resource strain: Not on file    Food insecurity:     Worry: Not on file     Inability: Not on file    Transportation needs:     Medical: Not on file     Non-medical: Not on file   Tobacco Use    Smoking status: Never Smoker    Smokeless tobacco: Never Used   Substance and Sexual Activity    Alcohol use: No    Drug use: No    Sexual activity: Yes     Partners: Male   Lifestyle    Physical activity:     Days per week: Not on file     Minutes per session: Not on file    Stress: Not on file   Relationships    Social connections:     Talks on phone: Not on file     Gets together: Not on file     Attends [employer] service: Not on file     Active member of club or organization: Not on file     Attends meetings of clubs or organizations: Not on file     Relationship status: Not on file   Other Topics Concern    Not on file   Social History Narrative    Not on file       Chief Complaint:   Chief Complaint   Patient presents with    Right Knee - Pain, Post-op Evaluation       Consulting Physician: No ref. provider found    History of present illness: This is a 45 y.o. female following up for right knee pain and weakness.  She reports occasional episodes of giving out.  History of right ACL reconstruction on 08/22/2019.      Review of Systems:    Constitution: Denies chills, fever, and sweats.    HENT: Denies headaches or blurry  vision.    Cardiovascular: Denies chest pain or irregular heart beat.    Respiratory: Denies cough or shortness of breath.    Gastrointestinal: Denies abdominal pain, nausea, or vomiting.    Musculoskeletal:  Denies muscle cramps.    Neurological: Denies dizziness or focal weakness.    Psychiatric/Behavioral: Normal mental status.    Hematologic/Lymphatic: Denies bleeding problem or easy bruising/bleeding.    Skin: Denies rash or suspicious lesions.      Examination:    Vital Signs:    Vitals:    05/22/20 1119   Resp: 18   Temp: 98.6 °F (37 °C)       Body mass index is 28.67 kg/m².    This a well-developed, well nourished patient in no acute distress.    Alert and oriented and cooperative to examination.       Physical Exam:  Right knee    Inspection/Observation   Swelling:   None  Erythema:   none  Bruising:   none  Wounds:   Healed  Drainage:  None    Range of Motion   Full    Muscle Strength   4+5/5    Other   Sensation:  normal  Pulse:    palpable    Imaging:      Assessment: S/P ACL reconstruction    Chronic pain of right knee        Plan:  She has not been in PT for several weeks.  Her pain is up to 8/10.  She is still reporting episodes of the knee giving way.  She has observable VMO atrophy.  We will restart therapy here with an emphasis on strength..  Back in 6 weeks.  She is released for light duty only.    DISCLAIMER: This note may have been dictated using voice recognition software and may contain grammatical errors. Report sent to referring provider as required.

## 2020-05-25 DIAGNOSIS — Z98.890 S/P ACL RECONSTRUCTION: Primary | ICD-10-CM

## 2020-06-29 ENCOUNTER — DOCUMENTATION ONLY (OUTPATIENT)
Dept: REHABILITATION | Facility: HOSPITAL | Age: 45
End: 2020-06-29

## 2020-06-29 DIAGNOSIS — M25.60 LIMITED JOINT RANGE OF MOTION (ROM): ICD-10-CM

## 2020-06-29 DIAGNOSIS — M25.561 RIGHT KNEE PAIN, UNSPECIFIED CHRONICITY: Primary | ICD-10-CM

## 2020-06-29 DIAGNOSIS — R53.1 DECREASED STRENGTH: ICD-10-CM

## 2020-06-29 DIAGNOSIS — R29.898 RIGHT LEG WEAKNESS: ICD-10-CM

## 2020-06-29 DIAGNOSIS — R26.9 GAIT DISTURBANCE: ICD-10-CM

## 2020-06-29 NOTE — PROGRESS NOTES
Outpatient Therapy Discharge Summary     Name: Nilsa York  Clinic Number: [id number]    Therapy Diagnosis:   Encounter Diagnoses   Name Primary?    Right knee pain, unspecified chronicity Yes    Gait disturbance     Limited joint range of motion (ROM)     Decreased strength     Right leg weakness      Physician: Dr. Sd Trujillo MD    Physician Orders: PT Eval and Treat   Medical Diagnosis from Referral: Rupture of ACL R knee, initial encounter.   Evaluation Date: 11/8/2019      Date of Last visit: 1/21/2020  Total Visits Received: 16  Cancelled Visits: 22  No Show Visits: 2    Assessment    Goals: Per treatment note dated 1/17/2020  Short Term Goals:   1) Decrease max pain to < 5/10 continued   2) Improve AROM to 0*-130* Progressing   3) Patient will demonstrate safe lifting technique for knee to thigh (unable to address due to knee pain)        Long Term Goal Status:   modified:  1/3/2020    1) Pt will demonstrate normalized vinicio for walking, MET (11/8/2019)     2) Pt will demonstrate appropriate technique for mid squat to allow safe lifting mechanics,  Progressing    3) Pt will improve standing/walking tolerance to > 2 hours for performance of work related activities, Continued    4) Improve FOTO disability rating to < 25%, Continued    5) Pt will be independent in HEP.   Continued    6) Pt will prepare to return to work.  Has returned to work on light duty Modified:  Patient will demonstrate ability to perform tasks consistent with usual job function Not yet met    Discharge reason: Patient has not attended therapy since 1/21/2020    Plan   This patient is discharged from Physical Therapy

## 2020-07-09 ENCOUNTER — CLINICAL SUPPORT (OUTPATIENT)
Dept: REHABILITATION | Facility: HOSPITAL | Age: 45
End: 2020-07-09
Payer: OTHER MISCELLANEOUS

## 2020-07-09 DIAGNOSIS — Z98.890 S/P ACL RECONSTRUCTION: ICD-10-CM

## 2020-07-09 DIAGNOSIS — M54.16 LUMBAR RADICULITIS: Primary | ICD-10-CM

## 2020-07-09 PROCEDURE — 97110 THERAPEUTIC EXERCISES: CPT | Mod: PN

## 2020-07-09 PROCEDURE — 97161 PT EVAL LOW COMPLEX 20 MIN: CPT | Mod: PN

## 2020-07-09 NOTE — PATIENT INSTRUCTIONS
On Elbows (Prone)        Rise up on elbows as high as possible, keeping hips on floor. Hold ____ seconds.  Repeat ____ times per set. Do ____ sets per session. Do ____ sessions per day.     [email or web address]     Copyright © [employer]. All rights reserved.   SUPINE: Bridging        Place feet on surface. Tighten glutes. Raise hips up. ___ reps per set, ___ sets per day, ___ days per week      Copyright © [employer]. All rights reserved.   Knee-to-Chest Stretch: Bilateral        With hands behind knees, pull both knees in to chest until a comfortable stretch is felt in lower back and buttocks. Keep back relaxed. Hold ____ seconds.  Repeat ____ times per set. Do ____ sets per session. Do ____ sessions per day.     [email or web address]://[email or web address].[email or web address]     Copyright © [employer]. All rights reserved.   Supine Knee to Chest        Lie on back. Gently pull one knee toward chest. Hold ___ seconds.   Repeat ___ times per session. Do ___ sessions per day.    Copyright © [employer]. All rights reserved.   Pelvic Tilt        Flatten back by tightening stomach muscles and buttocks.  Repeat ____ times per set. Do ____ sets per session. Do ____ sessions per day.     [email or web address]     Copyright © [employer]. All rights reserved.

## 2020-07-09 NOTE — PLAN OF CARE
[facility]  Physical Therapy Initial Evaluation    Date: 7/9/2020   Name: Nilsa York  Clinic Number: [id number]    Therapy Diagnosis: low back pain/R knee weakness  Encounter Diagnoses   Name Primary?    Lumbar radiculitis Yes    S/P ACL reconstruction      Physician: Han Drake MD    Physician Orders: PT Eval and Treat   Medical Diagnosis from Referral: Right lumbar radiculitis  Evaluation Date: 7/9/2020  Authorization Period Expiration: 12/31/20  Plan of Care Expiration: 8/24/20  Visit # / Visits authorized: 1/ 2    Time In: 1745  Time Out: 1630  Total Appointment Time (timed & untimed codes): 45 minutes    Precautions: Standard    Subjective   Date of onset: 5/22/20  History of current condition - Nilsa reports: that eversince she had right knee surgery low back pain had been bothering her. Had ACL reconstruction 8/22/19 after injuring at work 6/11/19.      Medical History:   Past Medical History:   Diagnosis Date    Encounter for blood transfusion     Hypertension        Surgical History:   Nilsa York  has a past surgical history that includes Tubal ligation; Cholecystectomy; Gastric sleeve; and Knee arthroscopy w/ ACL reconstruction (Right, 8/22/2019).    Medications:   Nilsa has a current medication list which includes the following prescription(s): acetaminophen, dextroamphetamine-amphetamine, diclofenac sodium, ergocalciferol, fluticasone propionate, ibuprofen, lisinopril, mirtazapine, oxycodone-acetaminophen, rizatriptan, topiramate, tramadol, venlafaxine, and venlafaxine, and the following Facility-Administered Medications: lactated ringers and sodium chloride 0.9%.    Allergies:   Review of patient's allergies indicates:  No Known Allergies     Imaging, none: recent    Prior Therapy: knee   Social History: no steps at home;  Lives with her children  Occupation: CNA,   Prior Level of Function: independent  Current Level of Function: on light duty at  work., unable to do lifting.     Pain:  Current 7/10, worst 9/10, best 4/10   Location: left and bilateral back    Description: Aching, Throbbing and Shooting  Aggravating Factors: Bending, Night Time, Morning, Flexing and Lifting  Easing Factors: pain medication, ice, hot bath and rest    Patients goals: to rid of pain and function better.  Objective     Posture: assumes erect posture  Palpation: (-) tenderness; decreased abdominal tone  Sensation: numbness to right knee  Range of Motion/Strength: BLE's WFL  Thoracic/Lumbar  ROM Pain/Dysfunction with Movement:   Flexion   60  rigid   Extension    25    Right side bending    35   Pain end range   Left side bending    40    Right rotation    30   Pain end range   Left rotation    30   Pain end range       Flexibility: SLR 75 deg bilat  Gait: Without AD  Analysis: no significant gait deviation noted.  Bed Mobility:Independent  Transfers: Independent  Special Tests: SLR (-); FABERe(-)  Other: FOTO 53/100        Limitation/Restriction for FOTO lumbar spine Survey    Therapist reviewed FOTO scores for Nilsa York on 7/9/2020.   Limitation Score: 47%          TREATMENT   Treatment Time In: 1815  Treatment Time Out: 1830  Total Treatment time (time-based codes) separate from Evaluation: 15 minutes    Nilsa received therapeutic exercises to develop flexibility and core stabilization for 15 minutes including:  PPT  SKTC/DKTC  Bridging  Prone on elbow.    Home Exercises and Patient Education Provided    Education provided:   - POC discussed; HEP instructions    Written Home Exercises Provided: yes.  Exercises were reviewed and Nilsa was able to demonstrate them prior to the end of the session.  Nilsa demonstrated good  understanding of the education provided.     See EMR under Patient Instructions for exercises provided 7/9/2020.    Assessment   Nilsa is a 45 y.o. female referred to outpatient Physical Therapy with a medical diagnosis of R lumbar  radiculitis/ s/p ACL reconstruction. Patient presents with reports of right knee buckling; pain to low back; paresthesia right knee, rigid trunk with painful movements.poor core strength.    Patient prognosis is Good.   Patientt will benefit from skilled outpatient Physical Therapy to address the deficits stated above and in the chart below, provide patient /family education, and to maximize patientt's level of independence.     Plan of care discussed with patient: Yes  Patient's spiritual, cultural and educational needs considered and patient is agreeable to the plan of care and goals as stated below:     Anticipated Barriers for therapy: none    Medical Necessity is demonstrated by the following  History  Co-morbidities and personal factors that may impact the plan of care Co-morbidities:   anxiety    Personal Factors:   coping style     low   Examination  Body Structures and Functions, activity limitations and participation restrictions that may impact the plan of care Body Regions:   back  lower extremities  trunk    Body Systems:    gross symmetry  ROM  strength    Participation Restrictions:   none    Activity limitations:   Learning and applying knowledge  no deficits    General Tasks and Commands  no deficits    Communication  no deficits    Mobility  lifting and carrying objects    Self care  no deficits    Domestic Life  no deficits    Interactions/Relationships  no deficits    Life Areas  no deficits    Community and Social Life  no deficits         low   Clinical Presentation stable and uncomplicated low   Decision Making/ Complexity Score: low     Goals:  Short Term Goals: 2 weeks   1. Patient will report compliance with HEP.  2. Patient will demonstrate 90 deg SLR      Long Term Goals: 6 weeks   1. Patient will have pain-free trunk mobility approximating full range.  2. Patient will report no recurrence of right knee buckling.  3. Max pain-level <3/10  4. FOTO > 60/100    Plan   Plan of care  Certification: 7/9/2020 to 8/24/20.    Outpatient Physical Therapy 2 times weekly for 6 weeks to include the following interventions: Cervical/Lumbar Traction, Electrical Stimulation , dry needling, Manual Therapy, Moist Heat/ Ice, Therapeutic Activites, Therapeutic Exercise and Ultrasound.     Lan Rivera, PT

## 2020-07-17 ENCOUNTER — CLINICAL SUPPORT (OUTPATIENT)
Dept: REHABILITATION | Facility: HOSPITAL | Age: 45
End: 2020-07-17
Payer: OTHER MISCELLANEOUS

## 2020-07-17 DIAGNOSIS — M54.16 LUMBAR RADICULITIS: Primary | ICD-10-CM

## 2020-07-17 DIAGNOSIS — Z98.890 S/P ACL RECONSTRUCTION: ICD-10-CM

## 2020-07-17 PROCEDURE — 97110 THERAPEUTIC EXERCISES: CPT | Mod: PN,CQ

## 2020-07-17 NOTE — PROGRESS NOTES
"  Physical Therapy Treatment Note     Name: Nilsa York  Clinic Number: [id number]    Therapy Diagnosis: Therapy Diagnosis: low back pain/R knee weakness       Encounter Diagnoses   Name Primary?    Lumbar radiculitis Yes    S/P ACL reconstruction         Physician: Han Drake MD    Visit Date: 7/17/2020    Physician Orders: PT Eval and Treat   Medical Diagnosis from Referral: Right lumbar radiculitis  Evaluation Date: 7/9/2020  Authorization Period Expiration: 12/31/20  Plan of Care Expiration: 8/24/20  Visit # / Visits authorized: 2/ 2       Time In: 1035 (late arrival)  Time Out: 1100  Total Billable Time: 25 minutes    Precautions: Standard    Subjective     Pt reports: "Slept in" 2* drowsiness from allergy meds. Has been deep squatting at home with lifting times from the floor while organizing, which she states has increased her pain.   She was compliant with home exercise program.  Response to previous treatment: a little soreness but not much  Functional change: none stated since this is the first session since eval.     Pain: 7/10  Location: bilateral low back      Objective     Nilsa received therapeutic exercises to develop strength, endurance, ROM, flexibility, posture and core stabilization for 25 minutes including:    Supine on table 10/2:     PPT     TrA sets     Ballsqueeze hip aDD     DKC with swiss ball     LTR with swiss ball     SLR low ranges 10/10    Home Exercises Provided and Patient Education Provided     Education provided:   - Educated pt that he/she may feel soreness after session.    - No lifting   -reviewed proper body mechanics with squats    Written Home Exercises Provided: Patient instructed to cont prior HEP.  Exercises were reviewed and Nilsa was able to demonstrate them prior to the end of the session.  Nilsa demonstrated good  understanding of the education provided.     See EMR under Patient Instructions for exercises provided 7/9/2020.    Assessment "     Limited 2* time constraints as pt had late arrival. Slow moving through ex's and transfers. Pt demo her squats that she performs with lifting things from her floor at home. Squats are deep and weight shifted too far forward. Advised pt to avoid deep squatting and any lifting for now.   Nilsa is progressing well towards her goals.   Pt prognosis is Good.     Pt will continue to benefit from skilled outpatient physical therapy to address the deficits listed in the problem list box on initial evaluation, provide pt/family education and to maximize pt's level of independence in the home and community environment.     Pt's spiritual, cultural and educational needs considered and pt agreeable to plan of care and goals.     Anticipated barriers to physical therapy: none      Goals:   Short Term Goals: 2 weeks   1. Patient will report compliance with HEP. (Progressing)  2. Patient will demonstrate 90 deg SLR (Progressing)        Long Term Goals: 6 weeks   1. Patient will have pain-free trunk mobility approximating full range.  2. Patient will report no recurrence of right knee buckling.  3. Max pain-level <3/10  4. FOTO > 60/100      Plan     Continue per POC, progressing as appropriate.      Neva Childers, PTA

## 2020-07-23 DIAGNOSIS — Z11.59 SPECIAL SCREENING EXAMINATION FOR UNSPECIFIED VIRAL DISEASE: ICD-10-CM

## 2020-07-24 ENCOUNTER — TELEPHONE (OUTPATIENT)
Dept: ORTHOPEDICS | Facility: CLINIC | Age: 45
End: 2020-07-24

## 2020-07-24 NOTE — TELEPHONE ENCOUNTER
Called patient, no answer. LVM advising workers comp approved continued physical therapy. Can contact [facility] to schedule an appointment. Telephone number given.

## 2020-07-31 ENCOUNTER — CLINICAL SUPPORT (OUTPATIENT)
Dept: REHABILITATION | Facility: HOSPITAL | Age: 45
End: 2020-07-31
Payer: OTHER MISCELLANEOUS

## 2020-07-31 DIAGNOSIS — Z98.890 S/P ACL RECONSTRUCTION: ICD-10-CM

## 2020-07-31 DIAGNOSIS — M54.16 LUMBAR RADICULITIS: ICD-10-CM

## 2020-07-31 PROCEDURE — 97110 THERAPEUTIC EXERCISES: CPT | Mod: PN,CQ

## 2020-07-31 NOTE — PROGRESS NOTES
"  Physical Therapy Treatment Note     Name: Nilsa York  Clinic Number: [id number]    Therapy Diagnosis:   Encounter Diagnoses   Name Primary?    Lumbar radiculitis     S/P ACL reconstruction      Physician: Han Drake MD    Visit Date: 7/31/2020    Physician Orders: PT Eval and Treat   Medical Diagnosis from Referral: Right lumbar radiculitis  Evaluation Date: 7/9/2020  Authorization Period Expiration: 12/31/20  Plan of Care Expiration: 8/24/20  Visit # / Visits authorized: 3/12    Time In: 1000  Time Out: 1040  Total Billable Time: 40 minutes    Precautions: s/p acl reconstruction    Subjective     Pt reports: Continued back pain and R knee pain.  She was compliant with home exercise program.  Response to previous treatment: no problem  Functional change: none stated    Pain: 7/10  Location: bilateral back  and R knee      Objective     Nilsa received therapeutic exercises to develop strength, endurance, flexibility and core stabilization for 40 minutes including:    Bike Lv1 10'  Hamstring stretch 3x30" seated R/L  Piriformis stretch 3x30" seated R/L  Supine:PPT x30   Ball squeezes x30   LTR with Tball x30   DKTC with Tball x30   Hip 4-way x10 R/L  SportsArt 22# 2x20 B flexion and B extension    Home Exercises Provided and Patient Education Provided     Education provided:   - Setting abdominal muscles with all mobility    Written Home Exercises Provided: Patient instructed to cont prior HEP.  Exercises were reviewed and Nilsa was able to demonstrate them prior to the end of the session.  Nilsa demonstrated good  understanding of the education provided.     See EMR under Patient Instructions for exercises provided prior visit.    Assessment     Patient tolerated activities with rest breaks.    Nilsa is progressing well towards her goals.   Pt prognosis is Good.     Pt will continue to benefit from skilled outpatient physical therapy to address the deficits listed in the problem list box " on initial evaluation, provide pt/family education and to maximize pt's level of independence in the home and community environment.     Pt's spiritual, cultural and educational needs considered and pt agreeable to plan of care and goals.     Anticipated barriers to physical therapy: none    Goals:     Short Term Goals: 2 weeks   1. Patient will report compliance with HEP. (progressing)  2. Patient will demonstrate 90 deg SLR (progressing)        Long Term Goals: 6 weeks   1. Patient will have pain-free trunk mobility approximating full range.  2. Patient will report no recurrence of right knee buckling.  3. Max pain-level <3/10  4. FOTO > 60/100      Plan     Continue with POC to increase activity endurance as patient tolerates.    Kylie Brady, PTA

## 2020-09-11 ENCOUNTER — DOCUMENTATION ONLY (OUTPATIENT)
Dept: REHABILITATION | Facility: HOSPITAL | Age: 45
End: 2020-09-11

## 2020-09-11 NOTE — PROGRESS NOTES
Outpatient Therapy Discharge Summary     Name: Nilsa York  Clinic Number: [id number]    Therapy Diagnosis: LBP/R knee weakness  Physician: Han Drake MD    Physician Orders: eval and treat  Medical Diagnosis: R lumbar radiculiitis  Evaluation Date: 7/9/20      Date of Last visit: 7/31/20  Total Visits Received: 3  Cancelled Visits: 4  No Show Visits: 5    Assessment    Patient seen x 3 visit with therapy. All goals were ongoing.  Goals:   1. Patient will have pain-free trunk mobility approximating full range.  2. Patient will report no recurrence of right knee buckling.  3. Max pain-level <3/10  4. FOTO > 60/100       Discharge reason: Patient has not attended therapy since 7/31/20    Plan   This patient is discharged from Physical Therapy.  Unplanned DC

## 2020-09-14 ENCOUNTER — HOSPITAL ENCOUNTER (EMERGENCY)
Facility: HOSPITAL | Age: 45
Discharge: HOME | End: 2020-09-14
Attending: EMERGENCY MEDICINE
Payer: OTHER MISCELLANEOUS

## 2020-09-14 VITALS
HEIGHT: 64 IN | OXYGEN SATURATION: 100 % | WEIGHT: 176 LBS | DIASTOLIC BLOOD PRESSURE: 73 MMHG | HEART RATE: 76 BPM | SYSTOLIC BLOOD PRESSURE: 133 MMHG | RESPIRATION RATE: 18 BRPM | BODY MASS INDEX: 30.05 KG/M2 | TEMPERATURE: 99 F

## 2020-09-14 DIAGNOSIS — R60.9 SWELLING: ICD-10-CM

## 2020-09-14 DIAGNOSIS — Z20.822 SUSPECTED COVID-19 VIRUS INFECTION: Primary | ICD-10-CM

## 2020-09-14 DIAGNOSIS — D64.9 ANEMIA, UNSPECIFIED TYPE: ICD-10-CM

## 2020-09-14 DIAGNOSIS — R05.9 COUGH: ICD-10-CM

## 2020-09-14 LAB
ALBUMIN SERPL BCP-MCNC: 3.7 G/DL (ref 3.5–5.2)
ALP SERPL-CCNC: 69 U/L (ref 55–135)
ALT SERPL W/O P-5'-P-CCNC: 14 U/L (ref 10–44)
ANION GAP SERPL CALC-SCNC: 6 MMOL/L (ref 8–16)
APTT PPP: 25.9 SEC (ref 23.6–33.3)
AST SERPL-CCNC: 16 U/L (ref 10–40)
B-HCG UR QL: NEGATIVE
BASOPHILS # BLD AUTO: 0.08 K/UL (ref 0–0.2)
BASOPHILS NFR BLD: 1 % (ref 0–1.9)
BILIRUB SERPL-MCNC: 0.5 MG/DL (ref 0.1–1)
BILIRUB UR QL STRIP: NEGATIVE
BUN SERPL-MCNC: 10 MG/DL (ref 6–20)
CALCIUM SERPL-MCNC: 8.2 MG/DL (ref 8.7–10.5)
CHLORIDE SERPL-SCNC: 111 MMOL/L (ref 95–110)
CLARITY UR: CLEAR
CO2 SERPL-SCNC: 20 MMOL/L (ref 23–29)
COLOR UR: COLORLESS
CREAT SERPL-MCNC: 1 MG/DL (ref 0.5–1.4)
CTP QC/QA: YES
DIFFERENTIAL METHOD: ABNORMAL
EOSINOPHIL # BLD AUTO: 0.3 K/UL (ref 0–0.5)
EOSINOPHIL NFR BLD: 3.4 % (ref 0–8)
ERYTHROCYTE [DISTWIDTH] IN BLOOD BY AUTOMATED COUNT: 18.4 % (ref 11.5–14.5)
EST. GFR  (AFRICAN AMERICAN): >60 ML/MIN/1.73 M^2
EST. GFR  (NON AFRICAN AMERICAN): >60 ML/MIN/1.73 M^2
FERRITIN SERPL-MCNC: 2 NG/ML (ref 20–300)
GLUCOSE SERPL-MCNC: 96 MG/DL (ref 70–110)
GLUCOSE UR QL STRIP: NEGATIVE
HCT VFR BLD AUTO: 25.9 % (ref 37–48.5)
HGB BLD-MCNC: 7.4 G/DL (ref 12–16)
HGB UR QL STRIP: NEGATIVE
IMM GRANULOCYTES # BLD AUTO: 0.02 K/UL (ref 0–0.04)
IMM GRANULOCYTES NFR BLD AUTO: 0.3 % (ref 0–0.5)
INR PPP: 1.1
IRON SERPL-MCNC: 16 UG/DL (ref 30–160)
KETONES UR QL STRIP: NEGATIVE
LEUKOCYTE ESTERASE UR QL STRIP: NEGATIVE
LYMPHOCYTES # BLD AUTO: 1.8 K/UL (ref 1–4.8)
LYMPHOCYTES NFR BLD: 24 % (ref 18–48)
MCH RBC QN AUTO: 20.6 PG (ref 27–31)
MCHC RBC AUTO-ENTMCNC: 28.6 G/DL (ref 32–36)
MCV RBC AUTO: 72 FL (ref 82–98)
MONOCYTES # BLD AUTO: 0.6 K/UL (ref 0.3–1)
MONOCYTES NFR BLD: 7.3 % (ref 4–15)
NEUTROPHILS # BLD AUTO: 4.9 K/UL (ref 1.8–7.7)
NEUTROPHILS NFR BLD: 64 % (ref 38–73)
NITRITE UR QL STRIP: NEGATIVE
NRBC BLD-RTO: 0 /100 WBC
PH UR STRIP: 8 [PH] (ref 5–8)
PLATELET # BLD AUTO: 271 K/UL (ref 150–350)
PMV BLD AUTO: 10.1 FL (ref 9.2–12.9)
POTASSIUM SERPL-SCNC: 3.9 MMOL/L (ref 3.5–5.1)
PROT SERPL-MCNC: 7.1 G/DL (ref 6–8.4)
PROT UR QL STRIP: NEGATIVE
PROTHROMBIN TIME: 13.6 SEC (ref 10.6–14.8)
RBC # BLD AUTO: 3.59 M/UL (ref 4–5.4)
SATURATED IRON: 3 % (ref 20–50)
SODIUM SERPL-SCNC: 137 MMOL/L (ref 136–145)
SP GR UR STRIP: 1.01 (ref 1–1.03)
TOTAL IRON BINDING CAPACITY: 476 UG/DL (ref 250–450)
TRANSFERRIN SERPL-MCNC: 340 MG/DL (ref 200–375)
URN SPEC COLLECT METH UR: ABNORMAL
UROBILINOGEN UR STRIP-ACNC: NEGATIVE EU/DL
WBC # BLD AUTO: 7.64 K/UL (ref 3.9–12.7)

## 2020-09-14 PROCEDURE — 99284 EMERGENCY DEPT VISIT MOD MDM: CPT | Mod: 25

## 2020-09-14 PROCEDURE — U0003 INFECTIOUS AGENT DETECTION BY NUCLEIC ACID (DNA OR RNA); SEVERE ACUTE RESPIRATORY SYNDROME CORONAVIRUS 2 (SARS-COV-2) (CORONAVIRUS DISEASE [COVID-19]), AMPLIFIED PROBE TECHNIQUE, MAKING USE OF HIGH THROUGHPUT TECHNOLOGIES AS DESCRIBED BY CMS-2020-01-R: HCPCS

## 2020-09-14 PROCEDURE — 81025 URINE PREGNANCY TEST: CPT | Performed by: EMERGENCY MEDICINE

## 2020-09-14 PROCEDURE — 81003 URINALYSIS AUTO W/O SCOPE: CPT

## 2020-09-14 PROCEDURE — 85610 PROTHROMBIN TIME: CPT

## 2020-09-14 PROCEDURE — 85025 COMPLETE CBC W/AUTO DIFF WBC: CPT

## 2020-09-14 PROCEDURE — 83540 ASSAY OF IRON: CPT

## 2020-09-14 PROCEDURE — 85730 THROMBOPLASTIN TIME PARTIAL: CPT

## 2020-09-14 PROCEDURE — 36415 COLL VENOUS BLD VENIPUNCTURE: CPT

## 2020-09-14 PROCEDURE — 80053 COMPREHEN METABOLIC PANEL: CPT

## 2020-09-14 PROCEDURE — 82728 ASSAY OF FERRITIN: CPT

## 2020-09-14 RX ORDER — FERROUS SULFATE 325(65) MG
325 TABLET ORAL 2 TIMES DAILY
Qty: 60 TABLET | Refills: 0 | Status: SHIPPED | OUTPATIENT
Start: 2020-09-14

## 2020-09-14 NOTE — ED NOTES
Dr. Mora aware of pt H&H. MD states pt is okay for discharge. Pt educated on discharge instructions, medications, and follow up care. Pt verbalized understanding. Pt stable at time of discharge

## 2020-09-14 NOTE — ED PROVIDER NOTES
Encounter Date: 9/14/2020       History     Chief Complaint   Patient presents with    COVID-19 Concerns     ONSET FRIDAY    Cough    Fever    Leg Swelling     RT     Patient reports fever and cough for 1 day.  Fever up to 101° F. Patient noted right leg swelling yesterday.  Swelling has improved.  No chest pain or shortness of breath.  No urinary symptoms.  No sick contacts.        Review of patient's allergies indicates:  No Known Allergies  Past Medical History:   Diagnosis Date    Encounter for blood transfusion     Hypertension      Past Surgical History:   Procedure Laterality Date    CHOLECYSTECTOMY      Gastric sleeve      KNEE ARTHROSCOPY W/ ACL RECONSTRUCTION Right 8/22/2019    Procedure: RECONSTRUCTION, KNEE, ACL, ARTHROSCOPIC;  Surgeon: Sd Trujillo MD;  Location: [facility];  Service: Orthopedics;  Laterality: Right;  anesthesia:  general and block    TUBAL LIGATION       No family history on file.  Social History     Tobacco Use    Smoking status: Never Smoker    Smokeless tobacco: Never Used   Substance Use Topics    Alcohol use: Yes    Drug use: No     Review of Systems   Constitutional: Positive for chills and fever.   HENT: Negative for congestion.    Eyes: Negative for visual disturbance.   Respiratory: Positive for cough. Negative for wheezing.    Cardiovascular: Positive for leg swelling. Negative for chest pain and palpitations.   Gastrointestinal: Negative for abdominal pain and vomiting.   Genitourinary: Negative for dysuria.   Musculoskeletal: Negative for joint swelling.   Neurological: Negative for headaches.   Psychiatric/Behavioral: Negative for confusion.       Physical Exam     Initial Vitals [09/14/20 1307]   BP Pulse Resp Temp SpO2   (!) 187/82 83 18 98.7 °F (37.1 °C) 99 %      MAP       --         Physical Exam    Nursing note and vitals reviewed.  Constitutional: She is not diaphoretic. No distress.   HENT:   Head: Normocephalic and atraumatic.   Eyes: Conjunctivae  are normal.   Neck: Normal range of motion.   Cardiovascular: Normal rate.   Pulmonary/Chest: Breath sounds normal.   Abdominal: Soft. There is no abdominal tenderness.   Musculoskeletal: Normal range of motion.      Comments: 2+ dorsalis pedis and posterior tibial pulses bilaterally.  Sensation intact.  No significant swelling appreciated.  No erythema or palpable cords.   Neurological: She is alert.   No gross deficits   Skin: No rash noted.   Psychiatric: She has a normal mood and affect.         ED Course   Procedures  Labs Reviewed   SARS-COV-2 (COVID-19) QUALITATIVE PCR   CBC W/ AUTO DIFFERENTIAL   COMPREHENSIVE METABOLIC PANEL   APTT   PROTIME-INR   POCT URINE PREGNANCY          Imaging Results          X-Ray Chest PA And Lateral (In process)  Result time 09/14/20 14:49:39                 Medical Decision Making:   History:   Old Medical Records: I decided to obtain old medical records.  Clinical Tests:   Lab Tests: Reviewed  Radiological Study: Reviewed  ED Management:  Patient presents with acute febrile illness.  Patient likely with COVID-19 infection.  No other infectious etiology identified.  Patient does have significant microcytic anemia.  Likely iron deficiency.  Iron studies are pending.  Will begin iron supplementation.  Patient will need further workup including GI workup.  Patient does report menorrhagia.                             Clinical Impression:       ICD-10-CM ICD-9-CM   1. Suspected Covid-19 Virus Infection  R68.89    2. Cough  R05 786.2   3. Swelling  R60.9 782.3   4. Anemia, unspecified type  D64.9 285.9                                  Tyler Mora MD  09/14/20 1600

## 2020-09-14 NOTE — ED NOTES
Pt presents to the ED with c/o fever and sob that started Saturday. Pt also with c/o right knee pain after knee surgery 1 year ago. Pt denies cp, sob, headache, abdominal pain or n/v at this time.

## 2020-09-14 NOTE — DISCHARGE INSTRUCTIONS
You must follow with your doctor for further evaluation of anemia.  This is likely iron deficient see.  You will need further workup including possible gastrointestinal site of bleeding.  Cannot rule out stomach ulcer or colon cancer.

## 2020-09-15 LAB — SARS-COV-2 RNA RESP QL NAA+PROBE: NOT DETECTED

## 2020-09-18 DIAGNOSIS — M25.561 RIGHT KNEE PAIN, UNSPECIFIED CHRONICITY: Primary | ICD-10-CM

## 2020-09-21 ENCOUNTER — HOSPITAL ENCOUNTER (OUTPATIENT)
Dept: RADIOLOGY | Facility: HOSPITAL | Age: 45
Discharge: HOME | End: 2020-09-21
Attending: ORTHOPAEDIC SURGERY
Payer: OTHER MISCELLANEOUS

## 2020-09-21 ENCOUNTER — OFFICE VISIT (OUTPATIENT)
Dept: ORTHOPEDICS | Facility: CLINIC | Age: 45
End: 2020-09-21
Payer: OTHER MISCELLANEOUS

## 2020-09-21 VITALS — BODY MASS INDEX: 30.05 KG/M2 | HEIGHT: 64 IN | WEIGHT: 176 LBS

## 2020-09-21 DIAGNOSIS — G89.29 CHRONIC PAIN OF RIGHT KNEE: Primary | ICD-10-CM

## 2020-09-21 DIAGNOSIS — M25.561 RIGHT KNEE PAIN, UNSPECIFIED CHRONICITY: Primary | ICD-10-CM

## 2020-09-21 DIAGNOSIS — Z98.890 S/P ACL RECONSTRUCTION: ICD-10-CM

## 2020-09-21 DIAGNOSIS — M25.561 CHRONIC PAIN OF RIGHT KNEE: Primary | ICD-10-CM

## 2020-09-21 DIAGNOSIS — M25.561 RIGHT KNEE PAIN, UNSPECIFIED CHRONICITY: ICD-10-CM

## 2020-09-21 PROCEDURE — 99999 PR PBB SHADOW E&M-EST. PATIENT-LVL III: CPT | Mod: PBBFAC,,, | Performed by: ORTHOPAEDIC SURGERY

## 2020-09-21 PROCEDURE — 73562 XR KNEE ORTHO RIGHT WITH FLEXION: ICD-10-PCS | Mod: 26,LT,, | Performed by: RADIOLOGY

## 2020-09-21 PROCEDURE — 73564 X-RAY EXAM KNEE 4 OR MORE: CPT | Mod: 26,RT,, | Performed by: RADIOLOGY

## 2020-09-21 PROCEDURE — 73564 XR KNEE ORTHO RIGHT WITH FLEXION: ICD-10-PCS | Mod: 26,RT,, | Performed by: RADIOLOGY

## 2020-09-21 PROCEDURE — 99999 PR PBB SHADOW E&M-EST. PATIENT-LVL III: ICD-10-PCS | Mod: PBBFAC,,, | Performed by: ORTHOPAEDIC SURGERY

## 2020-09-21 PROCEDURE — 73562 X-RAY EXAM OF KNEE 3: CPT | Mod: 26,LT,, | Performed by: RADIOLOGY

## 2020-09-21 PROCEDURE — 99213 OFFICE O/P EST LOW 20 MIN: CPT | Mod: S$GLB,,, | Performed by: ORTHOPAEDIC SURGERY

## 2020-09-21 PROCEDURE — 73562 X-RAY EXAM OF KNEE 3: CPT | Mod: TC,PN,LT

## 2020-09-21 PROCEDURE — 99213 PR OFFICE/OUTPT VISIT, EST, LEVL III, 20-29 MIN: ICD-10-PCS | Mod: S$GLB,,, | Performed by: ORTHOPAEDIC SURGERY

## 2020-09-21 RX ORDER — DICLOFENAC SODIUM 10 MG/G
2 GEL TOPICAL 4 TIMES DAILY
Qty: 100 G | Refills: 0 | Status: SHIPPED | OUTPATIENT
Start: 2020-09-21

## 2020-09-23 NOTE — PROGRESS NOTES
Past Medical History:   Diagnosis Date    Encounter for blood transfusion     Hypertension        Past Surgical History:   Procedure Laterality Date    CHOLECYSTECTOMY      Gastric sleeve      KNEE ARTHROSCOPY W/ ACL RECONSTRUCTION Right 8/22/2019    Procedure: RECONSTRUCTION, KNEE, ACL, ARTHROSCOPIC;  Surgeon: Sd Trujillo MD;  Location: Replaced by [facility];  Service: Orthopedics;  Laterality: Right;  anesthesia:  general and block    TUBAL LIGATION         Current Outpatient Medications   Medication Sig    acetaminophen (TYLENOL) 650 MG TbSR Take 650 mg by mouth every 8 (eight) hours.    dextroamphetamine-amphetamine 30 mg Tab Take 30 mg by mouth.    ergocalciferol (ERGOCALCIFEROL) 50,000 unit Cap Take 50,000 Units by mouth.    ferrous sulfate (FEOSOL) 325 mg (65 mg iron) Tab tablet Take 1 tablet (325 mg total) by mouth 2 (two) times daily.    fluticasone propionate (FLONASE) 50 mcg/actuation nasal spray 2 sprays by Nasal route.    lisinopril (PRINIVIL,ZESTRIL) 40 MG tablet Take 40 mg by mouth once daily.    mirtazapine (REMERON) 15 MG tablet TAKE 1 2 TO 1 (ONE HALF TO ONE) TABLET BY MOUTH ONCE DAILY AT NIGHT AS NEEDED    rizatriptan (MAXALT-MLT) 10 MG disintegrating tablet TAKE 1 TABLET BY MOUTH AS NEEDED FOR MIGRAINE. MAY REPEAT IN 2 HOURS IF NEEDED. MAX OF 30 MG 24HOURS    topiramate (TOPAMAX) 100 MG tablet Take 200 mg by mouth 2 (two) times daily.    traMADol (ULTRAM) 50 mg tablet Take 1 tablet (50 mg total) by mouth every 6 (six) hours as needed for Pain.    venlafaxine (EFFEXOR-XR) 150 MG Cp24 Take 225 mg by mouth once daily.     venlafaxine (EFFEXOR-XR) 75 MG 24 hr capsule TAKE 1 CAPSULE BY MOUTH ONCE DAILY IN THE MORNING WITH EFFEXOR 150MG TO 225MG DOSE    diclofenac sodium (VOLTAREN) 1 % Gel Apply 2 g topically 4 (four) times daily.    ibuprofen (ADVIL,MOTRIN) 800 MG tablet Take 1 tablet (800 mg total) by mouth 3 (three) times daily.    oxyCODONE-acetaminophen (PERCOCET)  mg per tablet  Take 1 tablet by mouth every 6 (six) hours as needed for Pain. Quantity medically necessary     No current facility-administered medications for this visit.      Facility-Administered Medications Ordered in Other Visits   Medication    lactated ringers infusion    sodium chloride 0.9% flush 3 mL       Review of patient's allergies indicates:  No Known Allergies    History reviewed. No pertinent family history.    Social History     Socioeconomic History    Marital status: Single     Spouse name: Not on file    Number of children: Not on file    Years of education: Not on file    Highest education level: Not on file   Occupational History    Not on file   Social Needs    Financial resource strain: Not on file    Food insecurity     Worry: Not on file     Inability: Not on file    Transportation needs     Medical: Not on file     Non-medical: Not on file   Tobacco Use    Smoking status: Never Smoker    Smokeless tobacco: Never Used   Substance and Sexual Activity    Alcohol use: Yes    Drug use: No    Sexual activity: Yes     Partners: Male   Lifestyle    Physical activity     Days per week: Not on file     Minutes per session: Not on file    Stress: Not on file   Relationships    Social connections     Talks on phone: Not on file     Gets together: Not on file     Attends Anglican service: Not on file     Active member of club or organization: Not on file     Attends meetings of clubs or organizations: Not on file     Relationship status: Not on file   Other Topics Concern    Not on file   Social History Narrative    Not on file       Chief Complaint:   Chief Complaint   Patient presents with    Right Knee - Pain, Follow-up     Pain Returned 2 months ago. Then she stated that the pain never went away since surgery.        Consulting Physician: No ref. provider found    History of present illness: This is a 45 y.o. female following up for right knee pain and weakness.  She reports occasional  episodes of giving out.  History of right ACL reconstruction on 08/22/2019. Pain 7/10. States pain has not gone away since surgery.      Review of Systems:    Constitution: Denies chills, fever, and sweats.    HENT: Denies headaches or blurry vision.    Cardiovascular: Denies chest pain or irregular heart beat.    Respiratory: Denies cough or shortness of breath.    Gastrointestinal: Denies abdominal pain, nausea, or vomiting.    Musculoskeletal:  Denies muscle cramps.    Neurological: Denies dizziness or focal weakness.    Psychiatric/Behavioral: Normal mental status.    Hematologic/Lymphatic: Denies bleeding problem or easy bruising/bleeding.    Skin: Denies rash or suspicious lesions.      Examination:    Vital Signs:    There were no vitals filed for this visit.    Body mass index is 30.21 kg/m².    This a well-developed, well nourished patient in no acute distress.    Alert and oriented and cooperative to examination.       Physical Exam:  Right knee    Inspection/Observation   Swelling:   None  Erythema:   none  Bruising:   none  Wounds:   Healed  Drainage:  None    Range of Motion   Full    Muscle Strength   5/5    ACL   Stable lachman and anterior drawer    Other   Sensation:  normal  Pulse:    palpable    Imaging:      Assessment: Chronic pain of right knee        Plan:  She continues to have pain and swelling. She had an ROBERTO done that I would like to review. We will order an MRI to assess the ACL and knee. Rx for voltaren.      DISCLAIMER: This note may have been dictated using voice recognition software and may contain grammatical errors. Report sent to referring provider as required.

## 2020-12-11 ENCOUNTER — TELEPHONE (OUTPATIENT)
Dept: ORTHOPEDICS | Facility: CLINIC | Age: 45
End: 2020-12-11

## 2020-12-11 ENCOUNTER — OFFICE VISIT (OUTPATIENT)
Dept: SPINE | Facility: CLINIC | Age: 45
End: 2020-12-11
Payer: OTHER MISCELLANEOUS

## 2020-12-11 VITALS
HEIGHT: 64 IN | SYSTOLIC BLOOD PRESSURE: 165 MMHG | HEART RATE: 81 BPM | WEIGHT: 176 LBS | BODY MASS INDEX: 30.05 KG/M2 | DIASTOLIC BLOOD PRESSURE: 91 MMHG

## 2020-12-11 DIAGNOSIS — M54.16 RIGHT LUMBAR RADICULITIS: Primary | ICD-10-CM

## 2020-12-11 DIAGNOSIS — M54.9 DORSALGIA, UNSPECIFIED: ICD-10-CM

## 2020-12-11 PROCEDURE — 99203 PR OFFICE/OUTPT VISIT, NEW, LEVL III, 30-44 MIN: ICD-10-PCS | Mod: S$GLB,,, | Performed by: PHYSICAL MEDICINE & REHABILITATION

## 2020-12-11 PROCEDURE — 99203 OFFICE O/P NEW LOW 30 MIN: CPT | Mod: S$GLB,,, | Performed by: PHYSICAL MEDICINE & REHABILITATION

## 2020-12-11 NOTE — PROGRESS NOTES
SUBJECTIVE:    Patient ID: Nilsa York is a 45 y.o. female.    Chief Complaint: Low-back Pain and Knee Pain (right )    She presents now with persistent complaints of primarily right-sided low back pain at the lumbosacral junction radiating into the posterolateral portion of the right thigh to the level of the knee.  She feels like things are getting worse.  She is still working part-time light duty.  Her bowel and bladder remain intact.  Pain level is 8 out 10.  She says that the MRI of the lumbar spine and physical therapy were both denied by worker's Comp.  At this point she wants to get the MRI scheduled        Past Medical History:   Diagnosis Date    Encounter for blood transfusion     Hypertension      Social History     Socioeconomic History    Marital status: Single     Spouse name: Not on file    Number of children: Not on file    Years of education: Not on file    Highest education level: Not on file   Occupational History    Not on file   Social Needs    Financial resource strain: Not on file    Food insecurity     Worry: Not on file     Inability: Not on file    Transportation needs     Medical: Not on file     Non-medical: Not on file   Tobacco Use    Smoking status: Never Smoker    Smokeless tobacco: Never Used   Substance and Sexual Activity    Alcohol use: Yes    Drug use: No    Sexual activity: Yes     Partners: Male   Lifestyle    Physical activity     Days per week: Not on file     Minutes per session: Not on file    Stress: Not on file   Relationships    Social connections     Talks on phone: Not on file     Gets together: Not on file     Attends Lutheran service: Not on file     Active member of club or organization: Not on file     Attends meetings of clubs or organizations: Not on file     Relationship status: Not on file   Other Topics Concern    Not on file   Social History Narrative    Not on file     Past Surgical History:   Procedure Laterality Date     "CHOLECYSTECTOMY      Gastric sleeve      KNEE ARTHROSCOPY W/ ACL RECONSTRUCTION Right 8/22/2019    Procedure: RECONSTRUCTION, KNEE, ACL, ARTHROSCOPIC;  Surgeon: Sd Trujillo MD;  Location: [facility];  Service: Orthopedics;  Laterality: Right;  anesthesia:  general and block    TUBAL LIGATION       History reviewed. No pertinent family history.  Vitals:    12/11/20 1542   BP: (!) 165/91   Pulse: 81   Weight: 79.8 kg (176 lb)   Height: 5' 4" (1.626 m)       Review of Systems   Constitutional: Negative for chills, diaphoresis, fatigue, fever and unexpected weight change.   HENT: Negative for trouble swallowing.    Eyes: Negative for visual disturbance.   Respiratory: Negative for shortness of breath.    Cardiovascular: Negative for chest pain.   Gastrointestinal: Negative for abdominal pain, constipation, nausea and vomiting.   Genitourinary: Negative for difficulty urinating.   Musculoskeletal: Negative for arthralgias, back pain, gait problem, joint swelling, myalgias, neck pain and neck stiffness.   Neurological: Negative for dizziness, speech difficulty, weakness, light-headedness, numbness and headaches.          Objective:      Physical Exam  Neurological:      Mental Status: She is alert and oriented to person, place, and time.      Comments: She is awake and in no acute distress  Mild tenderness palpation right lumbar paraspinous musculature with no palpable masses  Deep tendon reflexes +1 at both knees and both ankles  Strength is normal in both lower extremities             Assessment:       1. Right lumbar radiculitis    2. Dorsalgia, unspecified           Plan:     she has persistent low back pain and right leg radicular symptoms.  Will will proceed with MRI.  Consider epidural steroid injection versus formal physical therapy.  Follow-up with me after the scan      Right lumbar radiculitis    Dorsalgia, unspecified  -     MRI Lumbar Spine Without Contrast; Future; Expected date: 12/11/2020          "

## 2020-12-15 ENCOUNTER — HOSPITAL ENCOUNTER (OUTPATIENT)
Dept: RADIOLOGY | Facility: HOSPITAL | Age: 45
Discharge: HOME | End: 2020-12-15
Attending: ORTHOPAEDIC SURGERY
Payer: OTHER MISCELLANEOUS

## 2020-12-15 DIAGNOSIS — Z98.890 S/P ACL RECONSTRUCTION: ICD-10-CM

## 2020-12-15 DIAGNOSIS — M25.561 RIGHT KNEE PAIN, UNSPECIFIED CHRONICITY: ICD-10-CM

## 2020-12-15 PROCEDURE — 73721 MRI JNT OF LWR EXTRE W/O DYE: CPT | Mod: TC,RT

## 2020-12-15 PROCEDURE — 73721 MRI JNT OF LWR EXTRE W/O DYE: CPT | Mod: 26,RT,, | Performed by: RADIOLOGY

## 2020-12-15 PROCEDURE — 73721 MRI KNEE WITHOUT CONTRAST RIGHT: ICD-10-PCS | Mod: 26,RT,, | Performed by: RADIOLOGY

## 2020-12-18 ENCOUNTER — LAB VISIT (OUTPATIENT)
Dept: LAB | Facility: OTHER | Age: 45
End: 2020-12-18
Payer: OTHER MISCELLANEOUS

## 2020-12-18 DIAGNOSIS — Z03.818 ENCOUNTER FOR OBSERVATION FOR SUSPECTED EXPOSURE TO OTHER BIOLOGICAL AGENTS RULED OUT: ICD-10-CM

## 2020-12-18 PROCEDURE — U0003 INFECTIOUS AGENT DETECTION BY NUCLEIC ACID (DNA OR RNA); SEVERE ACUTE RESPIRATORY SYNDROME CORONAVIRUS 2 (SARS-COV-2) (CORONAVIRUS DISEASE [COVID-19]), AMPLIFIED PROBE TECHNIQUE, MAKING USE OF HIGH THROUGHPUT TECHNOLOGIES AS DESCRIBED BY CMS-2020-01-R: HCPCS

## 2020-12-19 DIAGNOSIS — U07.1 COVID-19 VIRUS DETECTED: ICD-10-CM

## 2020-12-19 LAB — SARS-COV-2 RNA RESP QL NAA+PROBE: DETECTED

## 2020-12-20 ENCOUNTER — NURSE TRIAGE (OUTPATIENT)
Dept: ADMINISTRATIVE | Facility: CLINIC | Age: 45
End: 2020-12-20

## 2020-12-20 NOTE — TELEPHONE ENCOUNTER
Patient is having most of the covid symptoms, her breathing is heavy and has SOB with fever.  Advised to go to the ER per protocol.    Reason for Disposition   MILD difficulty breathing (e.g., minimal/no SOB at rest, SOB with walking, pulse <100)    Additional Information   Negative: SEVERE difficulty breathing (e.g., struggling for each breath, speaks in single words)   Negative: Difficult to awaken or acting confused (e.g., disoriented, slurred speech)   Negative: Bluish (or gray) lips or face now   Negative: Shock suspected (e.g., cold/pale/clammy skin, too weak to stand, low BP, rapid pulse)   Negative: Sounds like a life-threatening emergency to the triager   Negative: [1] COVID-19 exposure AND [2] no symptoms   Negative: [1] Lives with someone known to have influenza (flu test positive) AND [2] flu-like symptoms (e.g., cough, runny nose, sore throat, SOB; with or without fever)   Negative: [1] Adult with possible COVID-19 symptoms AND [2] triager concerned about severity of symptoms or other causes   Negative: Immunization reaction suspected (e.g., fever, headache, muscle aches occurring during days 1-3 days after immunization)   Negative: COVID-19 and breastfeeding, questions about   Negative: SEVERE or constant chest pain or pressure (Exception: mild central chest pain, present only when coughing)   Negative: MODERATE difficulty breathing (e.g., speaks in phrases, SOB even at rest, pulse 100-120)   Negative: [1] Headache AND [2] stiff neck (can't touch chin to chest)    Protocols used: CORONAVIRUS (COVID-19) DIAGNOSED OR [id number]

## 2021-01-22 ENCOUNTER — OFFICE VISIT (OUTPATIENT)
Dept: ORTHOPEDICS | Facility: CLINIC | Age: 46
End: 2021-01-22
Payer: OTHER MISCELLANEOUS

## 2021-01-22 VITALS — BODY MASS INDEX: 30.05 KG/M2 | HEIGHT: 64 IN | WEIGHT: 176 LBS | RESPIRATION RATE: 16 BRPM

## 2021-01-22 DIAGNOSIS — M25.561 CHRONIC PAIN OF RIGHT KNEE: Primary | ICD-10-CM

## 2021-01-22 DIAGNOSIS — G89.29 CHRONIC PAIN OF RIGHT KNEE: Primary | ICD-10-CM

## 2021-01-22 PROCEDURE — 99999 PR PBB SHADOW E&M-EST. PATIENT-LVL III: ICD-10-PCS | Mod: PBBFAC,,, | Performed by: ORTHOPAEDIC SURGERY

## 2021-01-22 PROCEDURE — 99999 PR PBB SHADOW E&M-EST. PATIENT-LVL III: CPT | Mod: PBBFAC,,, | Performed by: ORTHOPAEDIC SURGERY

## 2021-01-22 PROCEDURE — 99213 PR OFFICE/OUTPT VISIT, EST, LEVL III, 20-29 MIN: ICD-10-PCS | Mod: S$GLB,,, | Performed by: ORTHOPAEDIC SURGERY

## 2021-01-22 PROCEDURE — 99213 OFFICE O/P EST LOW 20 MIN: CPT | Mod: S$GLB,,, | Performed by: ORTHOPAEDIC SURGERY

## 2021-02-18 NOTE — TELEPHONE ENCOUNTER
----- Message from Nilsa Hitchcock MD sent at 8/21/2017  3:01 PM CDT -----  F/u 6 months with adrian lee  
Appointment scheduled  
0 = understands/communicates without difficulty

## 2021-04-29 ENCOUNTER — PATIENT MESSAGE (OUTPATIENT)
Dept: RESEARCH | Facility: HOSPITAL | Age: 46
End: 2021-04-29

## 2021-05-12 ENCOUNTER — OFFICE VISIT (OUTPATIENT)
Dept: ORTHOPEDICS | Facility: CLINIC | Age: 46
End: 2021-05-12
Payer: OTHER MISCELLANEOUS

## 2021-05-12 VITALS — WEIGHT: 176 LBS | RESPIRATION RATE: 18 BRPM | HEIGHT: 64 IN | BODY MASS INDEX: 30.05 KG/M2

## 2021-05-12 DIAGNOSIS — M25.561 CHRONIC PAIN OF RIGHT KNEE: Primary | ICD-10-CM

## 2021-05-12 DIAGNOSIS — G89.29 CHRONIC PAIN OF RIGHT KNEE: Primary | ICD-10-CM

## 2021-05-12 PROCEDURE — 99999 PR PBB SHADOW E&M-EST. PATIENT-LVL III: CPT | Mod: PBBFAC,,, | Performed by: ORTHOPAEDIC SURGERY

## 2021-05-12 PROCEDURE — 99213 PR OFFICE/OUTPT VISIT, EST, LEVL III, 20-29 MIN: ICD-10-PCS | Mod: S$GLB,,, | Performed by: ORTHOPAEDIC SURGERY

## 2021-05-12 PROCEDURE — 99999 PR PBB SHADOW E&M-EST. PATIENT-LVL III: ICD-10-PCS | Mod: PBBFAC,,, | Performed by: ORTHOPAEDIC SURGERY

## 2021-05-12 PROCEDURE — 99213 OFFICE O/P EST LOW 20 MIN: CPT | Mod: S$GLB,,, | Performed by: ORTHOPAEDIC SURGERY

## 2021-05-20 ENCOUNTER — PATIENT MESSAGE (OUTPATIENT)
Dept: ORTHOPEDICS | Facility: CLINIC | Age: 46
End: 2021-05-20

## 2021-05-26 ENCOUNTER — PATIENT MESSAGE (OUTPATIENT)
Dept: SPINE | Facility: CLINIC | Age: 46
End: 2021-05-26

## 2021-06-17 ENCOUNTER — PATIENT MESSAGE (OUTPATIENT)
Dept: ORTHOPEDICS | Facility: CLINIC | Age: 46
End: 2021-06-17

## 2022-04-28 ENCOUNTER — OFFICE VISIT (OUTPATIENT)
Dept: URGENT CARE | Facility: CLINIC | Age: 47
End: 2022-04-28

## 2022-04-28 VITALS
OXYGEN SATURATION: 98 % | SYSTOLIC BLOOD PRESSURE: 140 MMHG | TEMPERATURE: 98 F | DIASTOLIC BLOOD PRESSURE: 72 MMHG | HEART RATE: 77 BPM

## 2022-04-28 DIAGNOSIS — J01.00 ACUTE NON-RECURRENT MAXILLARY SINUSITIS: ICD-10-CM

## 2022-04-28 DIAGNOSIS — J01.00 ACUTE NON-RECURRENT MAXILLARY SINUSITIS: Primary | ICD-10-CM

## 2022-04-28 DIAGNOSIS — R09.81 NASAL CONGESTION: Primary | ICD-10-CM

## 2022-04-28 LAB
CTP QC/QA: YES
CTP QC/QA: YES
FLUAV AG NPH QL: NEGATIVE
FLUBV AG NPH QL: NEGATIVE
SARS-COV-2 AG RESP QL IA.RAPID: NEGATIVE

## 2022-04-28 PROCEDURE — 87804 POCT INFLUENZA A/B: ICD-10-PCS | Mod: QW,,, | Performed by: NURSE PRACTITIONER

## 2022-04-28 PROCEDURE — 87811 SARS CORONAVIRUS 2 ANTIGEN POCT, MANUAL READ: ICD-10-PCS | Mod: QW,S$GLB,, | Performed by: NURSE PRACTITIONER

## 2022-04-28 PROCEDURE — 87811 SARS-COV-2 COVID19 W/OPTIC: CPT | Mod: QW,S$GLB,, | Performed by: NURSE PRACTITIONER

## 2022-04-28 PROCEDURE — 99204 OFFICE O/P NEW MOD 45 MIN: CPT | Mod: S$GLB,,, | Performed by: NURSE PRACTITIONER

## 2022-04-28 PROCEDURE — 99204 PR OFFICE/OUTPT VISIT, NEW, LEVL IV, 45-59 MIN: ICD-10-PCS | Mod: S$GLB,,, | Performed by: NURSE PRACTITIONER

## 2022-04-28 PROCEDURE — 87804 INFLUENZA ASSAY W/OPTIC: CPT | Mod: QW,,, | Performed by: NURSE PRACTITIONER

## 2022-04-28 RX ORDER — PSEUDOEPHEDRINE HCL 120 MG/1
120 TABLET, FILM COATED, EXTENDED RELEASE ORAL 2 TIMES DAILY
Qty: 14 TABLET | Refills: 0 | Status: SHIPPED | OUTPATIENT
Start: 2022-04-28 | End: 2022-05-05

## 2022-04-28 RX ORDER — DESLORATADINE 5 MG/1
5 TABLET ORAL DAILY
Qty: 15 TABLET | Refills: 0 | Status: SHIPPED | OUTPATIENT
Start: 2022-04-28 | End: 2022-05-13

## 2022-04-28 RX ORDER — AMOXICILLIN 500 MG/1
500 TABLET, FILM COATED ORAL EVERY 12 HOURS
Qty: 20 TABLET | Refills: 0 | Status: SHIPPED | OUTPATIENT
Start: 2022-04-28 | End: 2022-05-08

## 2022-04-28 NOTE — PROGRESS NOTES
Subjective:       Patient ID: Nilsa York is a 47 y.o. female.    Vitals:  oral temperature is 98.3 °F (36.8 °C). Her blood pressure is 140/72 (abnormal) and her pulse is 77. Her oxygen saturation is 98%.     Chief Complaint: Cough, Sore Throat, and Headache    Cough  This is a new problem. The current episode started in the past 7 days. The cough is productive of sputum. Associated symptoms include a fever, headaches and a sore throat.   Sore Throat   This is a new problem. The current episode started in the past 7 days. Associated symptoms include coughing and headaches.   Headache   This is a new problem. The current episode started in the past 7 days. Associated symptoms include coughing, a fever and a sore throat.       Constitution: Positive for fever.   HENT: Positive for sore throat.    Respiratory: Positive for cough.    Neurological: Positive for headaches.       Objective:      Physical Exam      Assessment:       No diagnosis found.      Plan:         There are no diagnoses linked to this encounter.

## 2022-04-28 NOTE — PROGRESS NOTES
CHIEF COMPLAINT  Chief Complaint   Patient presents with    Cough    Sore Throat    Headache       HPI  Nilsa Adams a 47 y.o. female who presents with c/o non productive cough, sore throat, sinus congestion, bilateral ear pain and headache x5 days with fever of 101.4 at home. Pt reports she has taken mucinex, theraflu, dayquil without relief of symptoms. Denies abdominal pain, n/v/d, rash, chest pain or SOB.       CURRENT MEDICATIONS  Current Outpatient Medications on File Prior to Visit   Medication Sig Dispense Refill    acetaminophen (TYLENOL) 650 MG TbSR Take 650 mg by mouth every 8 (eight) hours.      dextroamphetamine-amphetamine 30 mg Tab Take 30 mg by mouth.      diclofenac sodium (VOLTAREN) 1 % Gel Apply 2 g topically 4 (four) times daily. 100 g 0    ergocalciferol (ERGOCALCIFEROL) 50,000 unit Cap Take 50,000 Units by mouth.      ferrous sulfate (FEOSOL) 325 mg (65 mg iron) Tab tablet Take 1 tablet (325 mg total) by mouth 2 (two) times daily. 60 tablet 0    ibuprofen (ADVIL,MOTRIN) 800 MG tablet Take 1 tablet (800 mg total) by mouth 3 (three) times daily. 60 tablet 0    lisinopril (PRINIVIL,ZESTRIL) 40 MG tablet Take 40 mg by mouth once daily.      mirtazapine (REMERON) 15 MG tablet TAKE 1 2 TO 1 (ONE HALF TO ONE) TABLET BY MOUTH ONCE DAILY AT NIGHT AS NEEDED      oxyCODONE-acetaminophen (PERCOCET)  mg per tablet Take 1 tablet by mouth every 6 (six) hours as needed for Pain. Quantity medically necessary 28 tablet 0    rizatriptan (MAXALT-MLT) 10 MG disintegrating tablet TAKE 1 TABLET BY MOUTH AS NEEDED FOR MIGRAINE. MAY REPEAT IN 2 HOURS IF NEEDED. MAX OF 30 MG 24HOURS      topiramate (TOPAMAX) 100 MG tablet Take 200 mg by mouth 2 (two) times daily.      traMADol (ULTRAM) 50 mg tablet Take 1 tablet (50 mg total) by mouth every 6 (six) hours as needed for Pain. 30 tablet 0    venlafaxine (EFFEXOR-XR) 150 MG Cp24 Take 225 mg by mouth once daily.       venlafaxine (EFFEXOR-XR) 75  MG 24 hr capsule TAKE 1 CAPSULE BY MOUTH ONCE DAILY IN THE MORNING WITH EFFEXOR 150MG TO 225MG DOSE       Current Facility-Administered Medications on File Prior to Visit   Medication Dose Route Frequency Provider Last Rate Last Admin    lactated ringers infusion  10 mL/hr Intravenous Continuous Dileep Rodríguez MD   Stopped at 08/22/19 1700    sodium chloride 0.9% flush 3 mL  3 mL Intravenous Q8H Dileep Rodríguez MD           ALLERGIES  Review of patient's allergies indicates:  No Known Allergies    Immunization History   Administered Date(s) Administered    COVID-19, MRNA, LN-S, PF (Pfizer) (Purple Cap) 08/23/2021, 09/13/2021       PAST MEDICAL HISTORY  Past Medical History:   Diagnosis Date    Encounter for blood transfusion     Hypertension        SURGICAL HISTORY  Past Surgical History:   Procedure Laterality Date    CHOLECYSTECTOMY      Gastric sleeve      KNEE ARTHROSCOPY W/ ACL RECONSTRUCTION Right 8/22/2019    Procedure: RECONSTRUCTION, KNEE, ACL, ARTHROSCOPIC;  Surgeon: Sd Trujillo MD;  Location: [facility];  Service: Orthopedics;  Laterality: Right;  anesthesia:  general and block    TUBAL LIGATION         SOCIAL HISTORY  Social History     Socioeconomic History    Marital status: Single   Tobacco Use    Smoking status: Never Smoker    Smokeless tobacco: Never Used   Substance and Sexual Activity    Alcohol use: Yes    Drug use: No    Sexual activity: Yes     Partners: Male       FAMILY HISTORY  History reviewed. No pertinent family history.    REVIEW OF SYSTEMS  Constitutional: + fever, chills,and body aches  Eyes: No redness, pain, or discharge  HENT: bilateral ear pain, + headache, + rhinorrhea, + throat pain + sinus congestion   Respiratory: Non productive cough,no wheezing or shortness of breath  Cardiovascular: No chest pain, palpitations or edema  Musculoskeletal: No pain, full range of motion. Good sensation  Skin: No rash or abrasion  Neurologic: No focal weakness or  sensory changes.  All systems otherwise negative except as noted in the Review of Systems and History of Present Illness      PHYSICAL EXAM  Reviewed Triage Note  VITAL SIGNS: 140/72  Constitutional: Well developed, well nourished, Alert and oriented x3, obviously ill.  HENT: Normocephalic, Atraumatic, Bilateral external ears normal, bilateral ear canals erythematous without edema, external nose negative, bilateral turbinates erythematous with edema, oropharynx moist, No oral exudates.+ bilateral maxillary sinus tenderness  Eyes: PERRL, EOMI, Conjunctiva normal, No discharge.  Neck: Normal range of motion, no tenderness, supple, no carotid bruits  Respiratory: Normal breath sounds, no respiratory distress, no wheezing, no rhonchi, no rales  Cardiovascular: HR 77, normal rhythm, no murmurs, no rubs, no gallops.  Musculoskeletal: Good range of motion in all major joints.   Integument: Warm, Dry, No erythema, no rash  Neurologic: Normal motor function, normal sensory function. No focal deficits noted. Intact distal pulses  Psychiatric: Affect normal, judgment normal, mood normal      LABS  Pertinent labs reviewed. (see chart for details)  Flu negative  covid negative    RADIOLOGY  No orders to display         PROCEDURE  Procedures      Physical exam findings and lab results discussed with patient. No acute emergent medical condition identified at this time to warrant further testing. Will dispo home with instructions to follow up with PCP tomorrow. Pt agrees with plan of care.     DISPOSITION  Patient discharged in stable condition     CLINICAL IMPRESSION:  The primary encounter diagnosis was Nasal congestion. A diagnosis of Acute non-recurrent maxillary sinusitis was also pertinent to this visit.    Patient advised to follow-up with your PCP within 3 days for BP re-check if Blood Pressure was >120/80 without history of hypertension.

## (undated) DEVICE — SEE MEDLINE ITEM 146313

## (undated) DEVICE — BRACE KNEE POST OP HNG PD 17IN

## (undated) DEVICE — TUBING FLOSTEADY ARTHROSCOPY

## (undated) DEVICE — SEE MEDLINE ITEM 146271

## (undated) DEVICE — CLOSURE SKIN STERI STRIP 1/2X4

## (undated) DEVICE — ADHESIVE MASTISOL VIAL 48/BX

## (undated) DEVICE — SEE MEDLINE ITEM 157131

## (undated) DEVICE — SUT ETHILON 3-0 PS2 18 BLK

## (undated) DEVICE — BLADE SURG #15 CARBON STEEL

## (undated) DEVICE — PACK ARTHROSCOPY W/ISO BAC

## (undated) DEVICE — PAD ABD 8X10 STERILE

## (undated) DEVICE — APPLICATOR CHLORAPREP ORN 26ML

## (undated) DEVICE — SUT PDS VIL/BLU DUAL ORTHO

## (undated) DEVICE — NDL SPINAL 18GX3.5 SPINOCAN

## (undated) DEVICE — SEE MEDLINE ITEM 152530

## (undated) DEVICE — DRAPE STERI INSTRUMENT 1018

## (undated) DEVICE — ELECTRODE COOLPULSE 90 W/HAND

## (undated) DEVICE — ELECTRODE REM PLYHSV RETURN 9

## (undated) DEVICE — PAD CAST SPECIALIST STRL 6

## (undated) DEVICE — SEE MEDLINE ITEM 152622

## (undated) DEVICE — SEE MEDLINE ITEM 107746

## (undated) DEVICE — SUT STRATAFIX SPRL PS-2 3-0

## (undated) DEVICE — BLADE MEDIUM LONG 9MM X 31MM

## (undated) DEVICE — CUBE COLD THERAPY POLAR CARE

## (undated) DEVICE — UNDERGLOVES BIOGEL PI SZ 7 LF

## (undated) DEVICE — SUT 0 VICRYL / CT-1

## (undated) DEVICE — UNDERGLOVES BIOGEL PI SIZE 8.5

## (undated) DEVICE — BURR 4.0MM

## (undated) DEVICE — SOL IRR NACL .9% 3000ML

## (undated) DEVICE — PACK CUSTOM UNIV BASIN SLI

## (undated) DEVICE — BLADE INCISOR 3.5MM ELITE PLUS

## (undated) DEVICE — KNIFE ACL GRAFT 10MM

## (undated) DEVICE — SEE MEDLINE ITEM 157117

## (undated) DEVICE — SPONGE SUPER KERLIX 6X6.75IN

## (undated) DEVICE — SYS CLSR DERMABOND PRINEO 22CM

## (undated) DEVICE — TOURNIQUET SB QC DP 34X4IN

## (undated) DEVICE — GLOVE SURG ULTRA TOUCH 8.5

## (undated) DEVICE — GLOVE SURG ULTRA TOUCH 7

## (undated) DEVICE — BLADE SURG CARBON STEEL SZ11

## (undated) DEVICE — ACL DISPOSABLE KIT

## (undated) DEVICE — SEE MEDLINE ITEM 157118

## (undated) DEVICE — MANIFOLD 4 PORT

## (undated) DEVICE — SLEEVE SCD EXPRESS CALF MEDIUM

## (undated) DEVICE — STRAP OR TABLE 5IN X 72IN

## (undated) DEVICE — SUT 2-0 VICRYL / CT-1

## (undated) DEVICE — DRAPE STERI U-SHAPED 47X51IN

## (undated) DEVICE — MAT QUICK 40X30 FLOOR FLUID LF

## (undated) DEVICE — Device

## (undated) DEVICE — SEE MEDLINE ITEM 146231